# Patient Record
Sex: MALE | Race: WHITE | NOT HISPANIC OR LATINO | URBAN - METROPOLITAN AREA
[De-identification: names, ages, dates, MRNs, and addresses within clinical notes are randomized per-mention and may not be internally consistent; named-entity substitution may affect disease eponyms.]

---

## 2024-08-10 ENCOUNTER — EMERGENCY (EMERGENCY)
Facility: HOSPITAL | Age: 24
LOS: 1 days | Discharge: HOME | End: 2024-08-10
Attending: EMERGENCY MEDICINE | Admitting: EMERGENCY MEDICINE
Payer: COMMERCIAL

## 2024-08-10 VITALS
HEIGHT: 69 IN | HEART RATE: 96 BPM | TEMPERATURE: 98 F | SYSTOLIC BLOOD PRESSURE: 134 MMHG | WEIGHT: 184.97 LBS | RESPIRATION RATE: 18 BRPM | DIASTOLIC BLOOD PRESSURE: 88 MMHG | OXYGEN SATURATION: 99 %

## 2024-08-10 VITALS
DIASTOLIC BLOOD PRESSURE: 86 MMHG | OXYGEN SATURATION: 98 % | SYSTOLIC BLOOD PRESSURE: 151 MMHG | RESPIRATION RATE: 18 BRPM | HEART RATE: 86 BPM

## 2024-08-10 LAB
ALBUMIN SERPL ELPH-MCNC: 3.3 G/DL — LOW (ref 3.4–5)
ALP SERPL-CCNC: 78 U/L — SIGNIFICANT CHANGE UP (ref 40–120)
ALT FLD-CCNC: 40 U/L — SIGNIFICANT CHANGE UP (ref 12–42)
ANION GAP SERPL CALC-SCNC: 11 MMOL/L — SIGNIFICANT CHANGE UP (ref 9–16)
APTT BLD: 28.9 SEC — SIGNIFICANT CHANGE UP (ref 24.5–35.6)
AST SERPL-CCNC: 28 U/L — SIGNIFICANT CHANGE UP (ref 15–37)
BASOPHILS # BLD AUTO: 0.01 K/UL — SIGNIFICANT CHANGE UP (ref 0–0.2)
BASOPHILS NFR BLD AUTO: 0.1 % — SIGNIFICANT CHANGE UP (ref 0–2)
BILIRUB SERPL-MCNC: 0.4 MG/DL — SIGNIFICANT CHANGE UP (ref 0.2–1.2)
BUN SERPL-MCNC: 18 MG/DL — SIGNIFICANT CHANGE UP (ref 7–23)
CALCIUM SERPL-MCNC: 9.2 MG/DL — SIGNIFICANT CHANGE UP (ref 8.5–10.5)
CHLORIDE SERPL-SCNC: 107 MMOL/L — SIGNIFICANT CHANGE UP (ref 96–108)
CO2 SERPL-SCNC: 24 MMOL/L — SIGNIFICANT CHANGE UP (ref 22–31)
CREAT SERPL-MCNC: 1.02 MG/DL — SIGNIFICANT CHANGE UP (ref 0.5–1.3)
EGFR: 104 ML/MIN/1.73M2 — SIGNIFICANT CHANGE UP
EOSINOPHIL # BLD AUTO: 0.04 K/UL — SIGNIFICANT CHANGE UP (ref 0–0.5)
EOSINOPHIL NFR BLD AUTO: 0.4 % — SIGNIFICANT CHANGE UP (ref 0–6)
GLUCOSE SERPL-MCNC: 127 MG/DL — HIGH (ref 70–99)
HCT VFR BLD CALC: 44.7 % — SIGNIFICANT CHANGE UP (ref 39–50)
HGB BLD-MCNC: 14.7 G/DL — SIGNIFICANT CHANGE UP (ref 13–17)
HIV 1 & 2 AB SERPL IA.RAPID: SIGNIFICANT CHANGE UP
IMM GRANULOCYTES NFR BLD AUTO: 0.5 % — SIGNIFICANT CHANGE UP (ref 0–0.9)
INR BLD: 1.17 — SIGNIFICANT CHANGE UP (ref 0.85–1.18)
LYMPHOCYTES # BLD AUTO: 1.64 K/UL — SIGNIFICANT CHANGE UP (ref 1–3.3)
LYMPHOCYTES # BLD AUTO: 15.1 % — SIGNIFICANT CHANGE UP (ref 13–44)
MAGNESIUM SERPL-MCNC: 2 MG/DL — SIGNIFICANT CHANGE UP (ref 1.6–2.6)
MCHC RBC-ENTMCNC: 29.6 PG — SIGNIFICANT CHANGE UP (ref 27–34)
MCHC RBC-ENTMCNC: 32.9 GM/DL — SIGNIFICANT CHANGE UP (ref 32–36)
MCV RBC AUTO: 89.9 FL — SIGNIFICANT CHANGE UP (ref 80–100)
MONOCYTES # BLD AUTO: 0.44 K/UL — SIGNIFICANT CHANGE UP (ref 0–0.9)
MONOCYTES NFR BLD AUTO: 4.1 % — SIGNIFICANT CHANGE UP (ref 2–14)
NEUTROPHILS # BLD AUTO: 8.67 K/UL — HIGH (ref 1.8–7.4)
NEUTROPHILS NFR BLD AUTO: 79.8 % — HIGH (ref 43–77)
NRBC # BLD: 0 /100 WBCS — SIGNIFICANT CHANGE UP (ref 0–0)
PLATELET # BLD AUTO: 278 K/UL — SIGNIFICANT CHANGE UP (ref 150–400)
POTASSIUM SERPL-MCNC: 4.3 MMOL/L — SIGNIFICANT CHANGE UP (ref 3.5–5.3)
POTASSIUM SERPL-SCNC: 4.3 MMOL/L — SIGNIFICANT CHANGE UP (ref 3.5–5.3)
PROT SERPL-MCNC: 7.1 G/DL — SIGNIFICANT CHANGE UP (ref 6.4–8.2)
PROTHROM AB SERPL-ACNC: 12.8 SEC — SIGNIFICANT CHANGE UP (ref 9.5–13)
RBC # BLD: 4.97 M/UL — SIGNIFICANT CHANGE UP (ref 4.2–5.8)
RBC # FLD: 13.7 % — SIGNIFICANT CHANGE UP (ref 10.3–14.5)
SODIUM SERPL-SCNC: 142 MMOL/L — SIGNIFICANT CHANGE UP (ref 132–145)
TROPONIN I, HIGH SENSITIVITY RESULT: <4 NG/L — SIGNIFICANT CHANGE UP
WBC # BLD: 10.85 K/UL — HIGH (ref 3.8–10.5)
WBC # FLD AUTO: 10.85 K/UL — HIGH (ref 3.8–10.5)

## 2024-08-10 PROCEDURE — 73080 X-RAY EXAM OF ELBOW: CPT | Mod: 26,RT

## 2024-08-10 PROCEDURE — 70450 CT HEAD/BRAIN W/O DYE: CPT | Mod: 26,MC

## 2024-08-10 PROCEDURE — 99285 EMERGENCY DEPT VISIT HI MDM: CPT

## 2024-08-10 RX ORDER — BACTERIOSTATIC SODIUM CHLORIDE 0.9 %
1000 VIAL (ML) INJECTION ONCE
Refills: 0 | Status: COMPLETED | OUTPATIENT
Start: 2024-08-10 | End: 2024-08-10

## 2024-08-10 RX ORDER — MUPIROCIN CALCIUM 20 MG/G
1 CREAM TOPICAL ONCE
Refills: 0 | Status: COMPLETED | OUTPATIENT
Start: 2024-08-10 | End: 2024-08-10

## 2024-08-10 RX ORDER — CLOSTRIDIUM TETANI TOXOID ANTIGEN (FORMALDEHYDE INACTIVATED), CORYNEBACTERIUM DIPHTHERIAE TOXOID ANTIGEN (FORMALDEHYDE INACTIVATED), BORDETELLA PERTUSSIS TOXOID ANTIGEN (GLUTARALDEHYDE INACTIVATED), BORDETELLA PERTUSSIS FILAMENTOUS HEMAGGLUTININ ANTIGEN (FORMALDEHYDE INACTIVATED), BORDETELLA PERTUSSIS PERTACTIN ANTIGEN, AND BORDETELLA PERTUSSIS FIMBRIAE 2/3 ANTIGEN 5; 2; 2.5; 5; 3; 5 [LF]/.5ML; [LF]/.5ML; UG/.5ML; UG/.5ML; UG/.5ML; UG/.5ML
0.5 INJECTION, SUSPENSION INTRAMUSCULAR ONCE
Refills: 0 | Status: COMPLETED | OUTPATIENT
Start: 2024-08-10 | End: 2024-08-10

## 2024-08-10 RX ADMIN — Medication 1000 MILLILITER(S): at 13:26

## 2024-08-10 RX ADMIN — CLOSTRIDIUM TETANI TOXOID ANTIGEN (FORMALDEHYDE INACTIVATED), CORYNEBACTERIUM DIPHTHERIAE TOXOID ANTIGEN (FORMALDEHYDE INACTIVATED), BORDETELLA PERTUSSIS TOXOID ANTIGEN (GLUTARALDEHYDE INACTIVATED), BORDETELLA PERTUSSIS FILAMENTOUS HEMAGGLUTININ ANTIGEN (FORMALDEHYDE INACTIVATED), BORDETELLA PERTUSSIS PERTACTIN ANTIGEN, AND BORDETELLA PERTUSSIS FIMBRIAE 2/3 ANTIGEN 0.5 MILLILITER(S): 5; 2; 2.5; 5; 3; 5 INJECTION, SUSPENSION INTRAMUSCULAR at 13:24

## 2024-08-10 RX ADMIN — MUPIROCIN CALCIUM 1 APPLICATION(S): 20 CREAM TOPICAL at 13:25

## 2024-08-10 NOTE — ED PROVIDER NOTE - PATIENT PORTAL LINK FT
You can access the FollowMyHealth Patient Portal offered by [facility] by registering at the following website: [email or web address]. By joining [employer]’s FollowMyHealth portal, you will also be able to view your health information using other applications (apps) compatible with our system.
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
## 2024-08-10 NOTE — ED PROVIDER NOTE - PROGRESS NOTE DETAILS
Patient reports feeling better after hydration.  All results discussed with patient.  Cardiac and neurological workup negative.  Discussed possibility of a small concussion and need to rest for the next several days.  Patient demonstrates full understanding.  Patient does not have a primary care doctor here in the city so we will set him up with our care coordinator so she can get him both primary care and cardiology follow-up.  Patient demonstrates full understanding of emergent return precautions and follow-up plan.
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
## 2024-08-10 NOTE — ED PROVIDER NOTE - NSFOLLOWUPINSTRUCTIONS_ED_ALL_ED_FT
-YOU NEED FOLLOW-UP WITH BOTH A PRIMARY CARE DOCTOR. AND A CARDIOLOGIST.  OUR CARE COORDINATOR SHOULD BE CALLING YOU THIS MONDAY, SO PLEASE ANSWER YOUR PHONE.  BRING ALL PAPERWORK FROM TODAY'S VISIT TO YOUR FOLLOW-UP VISIT.    -TAKE OVER THE COUNTER TYLENOL 650MG BY MOUTH EVERY 4-6 HOURS AS NEEDED FOR PAIN.  DO NOT MIX WITH ALCOHOL OR OTHER PRESCRIPTION MEDICATIONS THAT ALREADY CONTAIN TYLENOL OR ACETAMINOPHEN.   -PLEASE RETURN TO THE ER IMMEDIATELY OR CALL 911 FOR ANY HIGH FEVER, TROUBLE BREATHING, VOMITING, SEVERE PAIN, OR ANY OTHER CONCERNS.    Concussion, Adult  Three rear views of the head showing how quick, sudden head movements injure the brain.  A concussion is a brain injury from a hard, direct hit (trauma) to the head or body. This direct hit causes the brain to shake quickly back and forth inside the skull. This can damage brain cells and cause chemical changes in the brain. A concussion may also be known as a mild traumatic brain injury (TBI).    The effects of a concussion can be serious. If you have a concussion, you should be very careful to avoid having a second concussion.    What are the causes?  This condition is caused by:  A direct hit to your head.  Sudden movement of your body that causes your brain to move back and forth inside the skull, such as in a car crash.  What are the signs or symptoms?  The signs of a concussion can be hard to notice. Early on, they may be missed by you, family members, and health care providers. You may look fine on the outside but may act or feel differently.    Every head injury is different. Symptoms are usually temporary but may last for days, weeks, or even months. Some symptoms appear right away, but other symptoms may not show up for hours or days.    Physical symptoms    Headaches.  Dizziness and problems with coordination or balance.  Sensitivity to light or noise.  Nausea or vomiting.  Tiredness (fatigue).  Vision or hearing problems.  Seizure.  Mental and emotional symptoms    Irritability or mood changes.  Memory problems.  Trouble concentrating, organizing, or making decisions.  Changes in eating or sleeping patterns.  Slowness in thinking, acting or reacting, speaking, or reading.  Anxiety or depression.  How is this diagnosed?  This condition is diagnosed based on your symptoms and injury.    You may also have tests, including:  Imaging tests, such as a CT scan or an MRI.  Neuropsychological tests. These measure your thinking, understanding, learning, and memory.  How is this treated?  Treatment for this condition includes:  Stopping sports or activity if you are injured.  Physical and mental rest and careful observation, usually at home.  Medicines to help with symptoms such as headaches, nausea, or difficulty sleeping.  Referral to a concussion clinic or rehab center.  Follow these instructions at home:  Activity    Limit activities that require a lot of thought or concentration, such as:  Doing homework or job-related work.  Watching TV.  Using the computer or phone.  Playing memory games and doing puzzles.  Rest helps your brain heal. Make sure you:  Get plenty of sleep. Most adults should get 7–9 hours of sleep each night.  Rest during the day. Take naps or rest breaks when you feel tired.  Avoid high-intensity exercise or physical activities that take a lot of effort. Stop any activity that worsens symptoms. Your health care provider may recommend light exercise such as walking.  Do not do high-risk activities that could cause a second concussion, such as riding a bike or playing sports.  Ask your health care provider when you can return to your normal activities, such as school, work, sports, and driving. Your ability to react may be slower after a brain injury. Never do these activities if you are dizzy.  General instructions    A bottle of beer, a glass of wine, and a glass of hard liquor with a "do not drink" sign over them.   Take over-the-counter and prescription medicines only as told by your health care provider. Some medicines, such as blood thinners (anticoagulants) and aspirin, may increase the risk for complications, such as bleeding.  Avoid taking opioid pain medicine while recovering from a concussion.  Do not drink alcohol until your health care provider says you can. Drinking alcohol may slow your recovery and can put you at risk of further injury.  Watch your symptoms and tell others around you to do the same. Complications sometimes occur after a concussion.  Tell your , teachers, school nurse, school counselor, , or  about your injury, symptoms, and restrictions.  See a mental health therapist if you feel anxious or depressed. Managing this condition can be challenging.  Keep all follow-up visits. Your health care provider will check on your recovery and give you a plan for returning to activities.  How is this prevented?  Avoiding another brain injury is very important. In rare cases, another injury can lead to permanent brain damage, brain swelling, or death. The risk of this is greatest during the first 7–10 days after a head injury. Avoid injuries by:  Stopping activities that could lead to a second concussion, such as contact or recreational sports, until your health care provider says it is okay.  Taking these actions once you have returned to sports or activities:  Avoid plays or moves that can cause you to crash into another person. This is how most concussions occur.  Follow the rules and be respectful of other players. Do not engage in violent or illegal plays.  Getting regular exercise that includes strength and balance training.  Wearing a properly fitting helmet during sports, biking, or other activities. Helmets can help protect you from serious skull and brain injuries, but they may not protect you from a concussion. Even when wearing a helmet, you should avoid being hit in the head.  Where to find more information  Centers for Disease Control and Prevention: cdc.gov  Contact a health care provider if:  Your symptoms do not improve or get worse.  You have new symptoms.  You have another injury.  Your coordination gets worse.  You have unusual behavior changes.  Get help right away if:  You have a severe or worsening headache.  You have weakness or numbness in any part of your body, slurred speech, vision changes, or confusion.  You vomit repeatedly.  You lose consciousness, are sleepier than normal, or are difficult to wake up.  You have a seizure.  These symptoms may be an emergency. Get help right away. Call 911.  Do not wait to see if the symptoms will go away.  Do not drive yourself to the hospital.  Also, get help right away if:  You have thoughts of hurting yourself or others.  Take one of these steps if you feel like you may hurt yourself or others, or have thoughts about taking your own life:  Go to your nearest emergency room.  Call 911.  Call the National Suicide Prevention Lifeline at 1-684.450.2384 or 772. This is open 24 hours a day.  Text the Crisis Text Line at 299478.  This information is not intended to replace advice given to you by your health care provider. Make sure you discuss any questions you have with your health care provider.
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
## 2024-08-10 NOTE — ED PROVIDER NOTE - PHYSICAL EXAMINATION
VITAL SIGNS: I have reviewed nursing notes and confirm.  CONSTITUTIONAL: Well-developed; well-nourished; in no apparent distress.  SKIN: Positive abrasions over the posterior scalp and the posterior aspect of the right elbow.  No lacerations.  HEAD: Normocephalic; atraumatic.  EYES: PERRL, EOM intact; conjunctiva and sclera clear.  ENT: No nasal discharge; airway clear.  NECK: Supple; non tender Midline.  CARD: S1, S2 normal; no murmurs, gallops, or rubs. Regular rate and rhythm.  RESP: No wheezes, rales or rhonchi.  ABD: Normal bowel sounds; soft; non-distended; non-tender; no hepatosplenomegaly.  MSK: Normal ROM. No clubbing, cyanosis or edema.  There is mild tenderness palpation over the right olecranon.  Otherwise other aspects of all 4 extremities are within normal limits.  NEURO: Alert, oriented. Grossly unremarkable.  PSYCH: Cooperative, appropriate.

## 2024-08-10 NOTE — ED PROVIDER NOTE - SECONDARY DIAGNOSIS.
H/O oophorectomy    S/P cardiac pacemaker procedure  [employer] ADDRL1  2011  S/P cholecystectomy    S/P partial resection of colon  > 5 yrs ago Head injury

## 2024-08-12 PROBLEM — Z00.00 ENCOUNTER FOR PREVENTIVE HEALTH EXAMINATION: Status: ACTIVE | Noted: 2024-08-12

## 2024-08-13 ENCOUNTER — APPOINTMENT (OUTPATIENT)
Dept: INTERNAL MEDICINE | Facility: CLINIC | Age: 24
End: 2024-08-13
Payer: COMMERCIAL

## 2024-08-13 VITALS
BODY MASS INDEX: 42.44 KG/M2 | OXYGEN SATURATION: 99 % | WEIGHT: 280 LBS | HEIGHT: 68 IN | DIASTOLIC BLOOD PRESSURE: 85 MMHG | TEMPERATURE: 97.3 F | SYSTOLIC BLOOD PRESSURE: 133 MMHG | HEART RATE: 73 BPM

## 2024-08-13 DIAGNOSIS — F32.A ANXIETY DISORDER, UNSPECIFIED: ICD-10-CM

## 2024-08-13 DIAGNOSIS — Z86.59 PERSONAL HISTORY OF OTHER MENTAL AND BEHAVIORAL DISORDERS: ICD-10-CM

## 2024-08-13 DIAGNOSIS — F41.9 ANXIETY DISORDER, UNSPECIFIED: ICD-10-CM

## 2024-08-13 DIAGNOSIS — G44.309 POST-TRAUMATIC HEADACHE, UNSPECIFIED, NOT INTRACTABLE: ICD-10-CM
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
## 2024-08-13 PROCEDURE — 99204 OFFICE O/P NEW MOD 45 MIN: CPT | Mod: 25

## 2024-08-13 PROCEDURE — G2211 COMPLEX E/M VISIT ADD ON: CPT | Mod: NC

## 2024-08-13 RX ORDER — ESCITALOPRAM OXALATE 10 MG/1
10 TABLET, FILM COATED ORAL
Refills: 0 | Status: ACTIVE | COMMUNITY
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
## 2024-08-14 DIAGNOSIS — S00.01XA ABRASION OF SCALP, INITIAL ENCOUNTER: ICD-10-CM

## 2024-08-14 DIAGNOSIS — S30.810A ABRASION OF LOWER BACK AND PELVIS, INITIAL ENCOUNTER: ICD-10-CM

## 2024-08-14 DIAGNOSIS — R55 SYNCOPE AND COLLAPSE: ICD-10-CM

## 2024-08-14 DIAGNOSIS — S50.311A ABRASION OF RIGHT ELBOW, INITIAL ENCOUNTER: ICD-10-CM

## 2024-08-14 DIAGNOSIS — F17.290 NICOTINE DEPENDENCE, OTHER TOBACCO PRODUCT, UNCOMPLICATED: ICD-10-CM

## 2024-08-14 DIAGNOSIS — F41.9 ANXIETY DISORDER, UNSPECIFIED: ICD-10-CM

## 2024-08-14 DIAGNOSIS — F32.A DEPRESSION, UNSPECIFIED: ICD-10-CM

## 2024-08-14 DIAGNOSIS — Z23 ENCOUNTER FOR IMMUNIZATION: ICD-10-CM

## 2024-08-14 DIAGNOSIS — S06.9XAA UNSPECIFIED INTRACRANIAL INJURY WITH LOSS OF CONSCIOUSNESS STATUS UNKNOWN, INITIAL ENCOUNTER: ICD-10-CM
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
## 2024-08-14 PROBLEM — G44.309 POST-TRAUMATIC HEADACHE, NOT INTRACTABLE, UNSPECIFIED CHRONICITY PATTERN: Status: ACTIVE | Noted: 2024-08-14
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
## 2024-08-14 NOTE — END OF VISIT
[] : Resident [FreeTextEntry3] : I, Dr. Luke Nunez, saw and evaluated this patient in the presence of the resident. I discussed the management with the resident.

## 2024-08-14 NOTE — HEALTH RISK ASSESSMENT
[2 - 3 times a week (3 pts)] : 2 - 3  times a week (3 points) [Yes] : In the past 12 months have you used drugs other than those required for medical reasons? Yes [2] : 2) Feeling down, depressed, or hopeless for more than half of the days (2) [de-identified] : Occasional [de-identified] : Marijuana [de-identified] : Biking, Walking, usually sedentary  [de-identified] : Meat, vegetable, grain [Alone] : lives alone [Smoke Detector] : smoke detector [Seat Belt] :  uses seat belt [Sunscreen] : uses sunscreen [FreeTextEntry2] :   [Current] : Current [de-identified] : Daily, 8 years [0] : 2) Feeling down, depressed, or hopeless: Not at all (0) [PHQ-2 Negative - No further assessment needed] : PHQ-2 Negative - No further assessment needed [[id number]] : 0

## 2024-08-14 NOTE — REVIEW OF SYSTEMS
[Headache] : headache [Dizziness] : dizziness [Unsteady Walk] : no ataxia [Memory Loss] : memory loss [Negative] : Gastrointestinal
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
## 2024-08-14 NOTE — HEALTH RISK ASSESSMENT
[2 - 3 times a week (3 pts)] : 2 - 3  times a week (3 points) [Yes] : In the past 12 months have you used drugs other than those required for medical reasons? Yes [2] : 2) Feeling down, depressed, or hopeless for more than half of the days (2) [de-identified] : Occasional [de-identified] : Marijuana [de-identified] : Biking, Walking, usually sedentary  [de-identified] : Meat, vegetable, grain [Alone] : lives alone [Smoke Detector] : smoke detector [Seat Belt] :  uses seat belt [Sunscreen] : uses sunscreen [FreeTextEntry2] :   [Current] : Current [de-identified] : Daily, 8 years [0] : 2) Feeling down, depressed, or hopeless: Not at all (0) [PHQ-2 Negative - No further assessment needed] : PHQ-2 Negative - No further assessment needed [[id number]] : 0
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
## 2024-08-14 NOTE — HEALTH RISK ASSESSMENT
[2 - 3 times a week (3 pts)] : 2 - 3  times a week (3 points) [Yes] : In the past 12 months have you used drugs other than those required for medical reasons? Yes [2] : 2) Feeling down, depressed, or hopeless for more than half of the days (2) [de-identified] : Occasional [de-identified] : Marijuana [de-identified] : Biking, Walking, usually sedentary  [de-identified] : Meat, vegetable, grain [Alone] : lives alone [Smoke Detector] : smoke detector [Seat Belt] :  uses seat belt [Sunscreen] : uses sunscreen [FreeTextEntry2] :   [Current] : Current [de-identified] : Daily, 8 years [0] : 2) Feeling down, depressed, or hopeless: Not at all (0) [PHQ-2 Negative - No further assessment needed] : PHQ-2 Negative - No further assessment needed [[id number]] : 0
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
## 2024-09-09 ENCOUNTER — APPOINTMENT (OUTPATIENT)
Dept: INTERNAL MEDICINE | Facility: CLINIC | Age: 24
End: 2024-09-09

## 2024-10-10 ENCOUNTER — INPATIENT (INPATIENT)
Facility: HOSPITAL | Age: 24
LOS: 0 days | Discharge: HOME | End: 2024-10-11
Attending: STUDENT IN AN ORGANIZED HEALTH CARE EDUCATION/TRAINING PROGRAM | Admitting: STUDENT IN AN ORGANIZED HEALTH CARE EDUCATION/TRAINING PROGRAM
Payer: COMMERCIAL

## 2024-10-10 VITALS
HEART RATE: 82 BPM | TEMPERATURE: 98 F | RESPIRATION RATE: 18 BRPM | HEIGHT: 68 IN | SYSTOLIC BLOOD PRESSURE: 138 MMHG | DIASTOLIC BLOOD PRESSURE: 91 MMHG | OXYGEN SATURATION: 96 % | WEIGHT: 270.07 LBS

## 2024-10-10 DIAGNOSIS — Z29.9 ENCOUNTER FOR PROPHYLACTIC MEASURES, UNSPECIFIED: ICD-10-CM

## 2024-10-10 DIAGNOSIS — R59.0 LOCALIZED ENLARGED LYMPH NODES: ICD-10-CM

## 2024-10-10 LAB
ALBUMIN SERPL ELPH-MCNC: 3.5 G/DL — SIGNIFICANT CHANGE UP (ref 3.4–5)
ALP SERPL-CCNC: 114 U/L — SIGNIFICANT CHANGE UP (ref 40–120)
ALT FLD-CCNC: 57 U/L — HIGH (ref 12–42)
ANION GAP SERPL CALC-SCNC: 9 MMOL/L — SIGNIFICANT CHANGE UP (ref 9–16)
APPEARANCE UR: ABNORMAL
AST SERPL-CCNC: 25 U/L — SIGNIFICANT CHANGE UP (ref 15–37)
BACTERIA # UR AUTO: NEGATIVE /HPF — SIGNIFICANT CHANGE UP
BASOPHILS # BLD AUTO: 0 K/UL — SIGNIFICANT CHANGE UP (ref 0–0.2)
BILIRUB SERPL-MCNC: 0.4 MG/DL — SIGNIFICANT CHANGE UP (ref 0.2–1.2)
BILIRUB UR-MCNC: NEGATIVE — SIGNIFICANT CHANGE UP
BUN SERPL-MCNC: 9 MG/DL — SIGNIFICANT CHANGE UP (ref 7–23)
CALCIUM SERPL-MCNC: 9.2 MG/DL — SIGNIFICANT CHANGE UP (ref 8.5–10.5)
CHLORIDE SERPL-SCNC: 103 MMOL/L — SIGNIFICANT CHANGE UP (ref 96–108)
CO2 SERPL-SCNC: 29 MMOL/L — SIGNIFICANT CHANGE UP (ref 22–31)
COLOR SPEC: YELLOW — SIGNIFICANT CHANGE UP
CREAT SERPL-MCNC: 0.79 MG/DL — SIGNIFICANT CHANGE UP (ref 0.5–1.3)
DIFF PNL FLD: NEGATIVE — SIGNIFICANT CHANGE UP
EGFR: 126 ML/MIN/1.73M2 — SIGNIFICANT CHANGE UP
EOSINOPHIL # BLD AUTO: 0.02 K/UL — SIGNIFICANT CHANGE UP (ref 0–0.5)
EOSINOPHIL NFR BLD AUTO: 0.3 % — SIGNIFICANT CHANGE UP (ref 0–6)
EPI CELLS # UR: PRESENT
GLUCOSE SERPL-MCNC: 96 MG/DL — SIGNIFICANT CHANGE UP (ref 70–99)
GLUCOSE UR QL: NEGATIVE MG/DL — SIGNIFICANT CHANGE UP
HCT VFR BLD CALC: 46.4 % — SIGNIFICANT CHANGE UP (ref 39–50)
HGB BLD-MCNC: 14.7 G/DL — SIGNIFICANT CHANGE UP (ref 13–17)
HIV 1 & 2 AB SERPL IA.RAPID: SIGNIFICANT CHANGE UP
IMM GRANULOCYTES NFR BLD AUTO: 0.3 % — SIGNIFICANT CHANGE UP (ref 0–0.9)
KETONES UR-MCNC: 15 MG/DL
LACTATE BLDV-MCNC: 1.4 MMOL/L — SIGNIFICANT CHANGE UP (ref 0.5–2)
LEUKOCYTE ESTERASE UR-ACNC: NEGATIVE — SIGNIFICANT CHANGE UP
LIDOCAIN IGE QN: >250 U/L — HIGH (ref 16–77)
LYMPHOCYTES # BLD AUTO: 1.4 K/UL — SIGNIFICANT CHANGE UP (ref 1–3.3)
LYMPHOCYTES # BLD AUTO: 17.9 % — SIGNIFICANT CHANGE UP (ref 13–44)
MCHC RBC-ENTMCNC: 28.2 PG — SIGNIFICANT CHANGE UP (ref 27–34)
MCHC RBC-ENTMCNC: 31.7 GM/DL — LOW (ref 32–36)
MCV RBC AUTO: 88.9 FL — SIGNIFICANT CHANGE UP (ref 80–100)
MONOCYTES # BLD AUTO: 0.51 K/UL — SIGNIFICANT CHANGE UP (ref 0–0.9)
MONOCYTES NFR BLD AUTO: 6.5 % — SIGNIFICANT CHANGE UP (ref 2–14)
NEUTROPHILS # BLD AUTO: 5.87 K/UL — SIGNIFICANT CHANGE UP (ref 1.8–7.4)
NEUTROPHILS NFR BLD AUTO: 75 % — SIGNIFICANT CHANGE UP (ref 43–77)
NITRITE UR-MCNC: NEGATIVE — SIGNIFICANT CHANGE UP
NRBC # BLD: 0 /100 WBCS — SIGNIFICANT CHANGE UP (ref 0–0)
PH UR: 6 — SIGNIFICANT CHANGE UP (ref 5–8)
PLATELET # BLD AUTO: 284 K/UL — SIGNIFICANT CHANGE UP (ref 150–400)
POTASSIUM SERPL-MCNC: 4.3 MMOL/L — SIGNIFICANT CHANGE UP (ref 3.5–5.3)
POTASSIUM SERPL-SCNC: 4.3 MMOL/L — SIGNIFICANT CHANGE UP (ref 3.5–5.3)
PROT SERPL-MCNC: 7.7 G/DL — SIGNIFICANT CHANGE UP (ref 6.4–8.2)
PROT UR-MCNC: NEGATIVE MG/DL — SIGNIFICANT CHANGE UP
RBC # BLD: 5.22 M/UL — SIGNIFICANT CHANGE UP (ref 4.2–5.8)
RBC # FLD: 12.8 % — SIGNIFICANT CHANGE UP (ref 10.3–14.5)
RBC CASTS # UR COMP ASSIST: 1 /HPF — SIGNIFICANT CHANGE UP (ref 0–4)
SODIUM SERPL-SCNC: 141 MMOL/L — SIGNIFICANT CHANGE UP (ref 132–145)
SP GR SPEC: 1.02 — SIGNIFICANT CHANGE UP (ref 1–1.03)
TROPONIN I, HIGH SENSITIVITY RESULT: <4 NG/L — SIGNIFICANT CHANGE UP
UROBILINOGEN FLD QL: 1 MG/DL — SIGNIFICANT CHANGE UP (ref 0.2–1)
WBC # BLD: 7.82 K/UL — SIGNIFICANT CHANGE UP (ref 3.8–10.5)
WBC # FLD AUTO: 7.82 K/UL — SIGNIFICANT CHANGE UP (ref 3.8–10.5)
WBC UR QL: 1 /HPF — SIGNIFICANT CHANGE UP (ref 0–5)

## 2024-10-10 PROCEDURE — 99285 EMERGENCY DEPT VISIT HI MDM: CPT

## 2024-10-10 PROCEDURE — 99223 1ST HOSP IP/OBS HIGH 75: CPT | Mod: GC

## 2024-10-10 PROCEDURE — 74177 CT ABD & PELVIS W/CONTRAST: CPT | Mod: 26,MC

## 2024-10-10 RX ORDER — SODIUM CHLORIDE 0.9 % (FLUSH) 0.9 %
1000 SYRINGE (ML) INJECTION ONCE
Refills: 0 | Status: COMPLETED | OUTPATIENT
Start: 2024-10-10 | End: 2024-10-10

## 2024-10-10 RX ORDER — ESCITALOPRAM OXALATE 10 MG
20 TABLET ORAL DAILY
Refills: 0 | Status: DISCONTINUED | OUTPATIENT
Start: 2024-10-10 | End: 2024-10-11

## 2024-10-10 RX ORDER — ACETAMINOPHEN 325 MG
650 TABLET ORAL EVERY 6 HOURS
Refills: 0 | Status: DISCONTINUED | OUTPATIENT
Start: 2024-10-10 | End: 2024-10-11

## 2024-10-10 RX ORDER — ESCITALOPRAM OXALATE 10 MG
1 TABLET ORAL
Refills: 0 | DISCHARGE

## 2024-10-10 RX ORDER — SODIUM CHLORIDE IRRIG SOLUTION 0.9 %
1000 SOLUTION, IRRIGATION IRRIGATION
Refills: 0 | Status: DISCONTINUED | OUTPATIENT
Start: 2024-10-10 | End: 2024-10-11

## 2024-10-10 RX ORDER — ONDANSETRON HCL/PF 4 MG/2 ML
4 VIAL (ML) INJECTION EVERY 8 HOURS
Refills: 0 | Status: DISCONTINUED | OUTPATIENT
Start: 2024-10-10 | End: 2024-10-11

## 2024-10-10 RX ORDER — MAG HYDROX/ALUMINUM HYD/SIMETH 200-200-20
30 SUSPENSION, ORAL (FINAL DOSE FORM) ORAL EVERY 4 HOURS
Refills: 0 | Status: DISCONTINUED | OUTPATIENT
Start: 2024-10-10 | End: 2024-10-11

## 2024-10-10 RX ORDER — ACETAMINOPHEN 325 MG
1000 TABLET ORAL ONCE
Refills: 0 | Status: COMPLETED | OUTPATIENT
Start: 2024-10-10 | End: 2024-10-10

## 2024-10-10 RX ADMIN — Medication 1000 MILLILITER(S): at 14:57

## 2024-10-10 RX ADMIN — Medication 1000 MILLILITER(S): at 18:24

## 2024-10-10 RX ADMIN — Medication 400 MILLIGRAM(S): at 14:57

## 2024-10-10 NOTE — ED PROVIDER NOTE - CLINICAL SUMMARY MEDICAL DECISION MAKING FREE TEXT BOX
24-year-old male, presents to the emergency department complaining of epigastric abdominal pain for the past few days. Patient found to have epigastric tenderness to palpation on exam.  Will plan to repeat medical labs here including a lipase, CBC, and CMP.  Will also obtain CT abdomen and pelvis with IV contrast as well as give IV fluids.  Patient instructed to remain n.p.o. whilst waiting for his imaging to be completed.  IV Tylenol ordered for pain relief.  Will reassess and dispo pending medical workup.

## 2024-10-10 NOTE — ED PROVIDER NOTE - NS ED ROS FT
+abd pain  Denies fevers, chills, diarrhea, constipation, urinary symptoms, chest pain, palpitations, shortness of breath, dyspnea on exertion, syncope/near syncope, cough/URI symptoms, headache, weakness, numbness, focal deficits, visual changes, gait or balance changes, dizziness

## 2024-10-10 NOTE — ED PROVIDER NOTE - OBJECTIVE STATEMENT
24-year-old male, presents to the emergency department complaining of epigastric abdominal pain for the past few days.  Patient went to a local urgent care yesterday who performed labs on him.  Today the patient was called because his labs returned, showing an elevated lipase level of 736.  Patient denies any nausea or vomiting tday but states he had an episode of vomiting yesterday.  He also endorses having  2 beers 5 nights ago.

## 2024-10-10 NOTE — H&P ADULT - ATTENDING COMMENTS
24-year-old male, with PMHx of GERD and depression/anxiety presenting with 2 days of epigastric found to have elevated lipase and CT finding of acute pancreatitis, admitted for further management    Plan   - likely drug induced 2/2 buproprion. CTAP w/o gallstones/CBD dilation. Lipid panel pending   - c/w maintenance fluids. advance diet as tolerated   - pain control- mild pain, c/w tylenol prn   - f/up GC/Chlymdia screen, RPR   - outpt PCP/GI f/up

## 2024-10-10 NOTE — H&P ADULT - PROBLEM SELECTOR PLAN 1
Pt with 2 days of dull epigastric pain and one episode of emesis yesterday found to have Lipase of 736 at urgent care. Pt  denies history of pancreatitis or gallstone in the past. Pt drinks alcohol twice weekly (2-4 drinks), no tobacco, or recreational drugs,. Pt reports regular BM in form and color. In the ED pt received 2L IVF and Ofirmev. CTAP showing mild acute interstitial pancreatitis involving the pancreatic tail without evidence of necrosis or peripancreatic fluid collection. Lipase >250 in ED. On exam pt in NAD and reports minimal pain and denies CP, SOB, N/V/D. DDx include alcohol vs gallstone vs TG vs drug induced. Pt with mild alcohol use, no gallstone on CT, Unknown TG level and recently started on  Wellbutrin by his Psychiatrist over the past 2 weeks, however was not tolerating it well (Headaches) and discontinued after experiencing epigastric pain as above. Etiology most likely Drug induced vs TG. Pt VSS will start maintenance fluids  as diet as tolerated.     - c/w LR 150ml/h   - Low fat diet as tolerated   - f/u Lipid panel in AM   - Tylenol for pain PRN Pt with 2 days of dull epigastric pain and one episode of emesis yesterday found to have Lipase of 736 at urgent care. Pt  denies history of pancreatitis or gallstone in the past. Pt drinks alcohol twice weekly (2-4 drinks), no tobacco, or recreational drugs,. Pt reports regular BM in form and color. In the ED pt received 2L IVF and Ofirmev. CTAP showing mild acute interstitial pancreatitis involving the pancreatic tail without evidence of necrosis or peripancreatic fluid collection. Lipase >250 in ED. On exam pt in NAD and reports minimal pain and denies CP, SOB, N/V/D. DDx include alcohol vs gallstone vs TG vs drug induced. Pt with mild alcohol use, no gallstone on CT, Unknown TG level and recently started on  Wellbutrin by his Psychiatrist over the past 2 weeks, however was not tolerating it well (Headaches) and discontinued after experiencing epigastric pain as above. Etiology most likely Drug induced vs TG. Pt VSS will start maintenance fluids  as diet as tolerated.     - c/w LR 150ml/h   - Low fat diet as tolerated   - f/u Lipid panel   - hold buproprion, only recently started   - Tylenol for pain PRN

## 2024-10-10 NOTE — ED PROVIDER NOTE - ATTENDING APP SHARED VISIT CONTRIBUTION OF CARE
24-year-old male with abdominal pain, diagnosed in the ED with pancreatitis, admitted to medicine service for pain control, IV fluid hydration, n.p.o. status for treatment of pancreatitis.

## 2024-10-10 NOTE — H&P ADULT - NSHPSOCIALHISTORY_GEN_ALL_CORE
Pt lives in [place], [place], works as a  Pt lives in [place], [place], works as a   Sexually active with men (receptive)

## 2024-10-10 NOTE — ED ADULT TRIAGE NOTE - CHIEF COMPLAINT QUOTE
Pt sent from [facility] for pancreatitis. Pt states he was seen there for abdominal pain yesterday and that bloodwork resulted today. Pt denies any N/V/D today but states he vomited yesterday.

## 2024-10-10 NOTE — H&P ADULT - ASSESSMENT
Pt is a 24-year-old male, with PMHx of GERD and depression/anxiety presenting with 2 days of epigastric found to have elevated lipase and CT finding of acute pancreatitis, admitted for further management.

## 2024-10-10 NOTE — H&P ADULT - PROBLEM SELECTOR PLAN 2
Pt with incidental finding of perirectal lymphadenopathy largest node measuring 1 cm in short axis. DDx include infectious/inflammatory or neoplastic. As perirectal LAD dose not seem to be a complication of pancreatitis would recommend further evaluation as oupt.    - f/u outpt Pt with incidental finding of perirectal lymphadenopathy largest node measuring 1 cm in short axis. DDx include infectious/inflammatory or neoplastic. As perirectal LAD does not seem to be a complication of pancreatitis. Pt reports being sexually active with men including anal receptive. Pt denies previous STI himself or in past partners. Currenly pt is not experiencing any symptoms of STIs.     - consider STI testing if symptomatic C/G, HSV, Syphilis   - f/u outpt

## 2024-10-10 NOTE — ED ADULT NURSE NOTE - OBJECTIVE STATEMENT
c/o midgastric abd pain 5/10, states went to urgent care and was told to come to ED for pancreatitis. GCS 15, pt denies medical problems.

## 2024-10-10 NOTE — H&P ADULT - NSHPLABSRESULTS_GEN_ALL_CORE
Urinalysis with Rflx Culture (10.10.24 @ 13:37)   Urine Appearance: Cloudy  Color: Yellow  Specific Gravity: 1.025  pH Urine: 6.0  Protein, Urine: Negative mg/dL  Glucose Qualitative, Urine: Negative mg/dL  Ketone - Urine: 15 mg/dL  Blood, Urine: Negative  Bilirubin: Negative  Urobilinogen: 1.0 mg/dL  Leukocyte Esterase Concentration: Negative  Nitrite: NegativeBlood Gas Venous - Lactate (10.10.24 @ 13:37)   Blood Gas Venous - Lactate: 1.4 mmol/LLipase (10.10.24 @ 13:37)   Lipase: >250 U/LComplete Blood Count + Automated Diff (10.10.24 @ 13:37)   WBC Count: 7.82 K/uL  RBC Count: 5.22 M/uL  Hemoglobin: 14.7 g/dL  Hematocrit: 46.4 %  Mean Cell Volume: 88.9 fl  Mean Cell Hemoglobin: 28.2 pg  Mean Cell Hemoglobin Conc: 31.7 gm/dL  Red Cell Distrib Width: 12.8 %  Platelet Count - Automated: 284 K/uL  Auto Neutrophil #: 5.87 K/uL  Auto Lymphocyte #: 1.40 K/uL  Auto Monocyte #: 0.51 K/uL  Auto Eosinophil #: 0.02 K/uL  Auto Basophil #: 0.00 K/uL  Auto Neutrophil %: 75.0: Differential percentages must be correlated with absolute numbers for   clinical significance. %  Auto Lymphocyte %: 17.9 %  Auto Monocyte %: 6.5 %  Auto Eosinophil %: 0.3 %  Auto Immature Granulocyte %: 0.3: (Includes meta, myelo and promyelocytes). Mild elevations in immature   granulocytes may be seen with many inflammatory processes and pregnancy;   clinical correlation suggested. %  Nucleated RBC: 0 /100 WBCs    Comprehensive Metabolic Panel (10.10.24 @ 13:37)   Sodium: 141 mmoL/L  Potassium: 4.3 mmol/L  Chloride: 103 mmol/L  Carbon Dioxide: 29 mmol/L  Anion Gap: 9 mmoL/L  Blood Urea Nitrogen: 9 mg/dL  Creatinine: 0.79 mg/dL  Glucose: 96 mg/dL  Calcium: 9.2 mg/dL  Protein Total: 7.7 g/dL  Albumin: 3.5 g/dL  Bilirubin Total: 0.4: Use of this assay is not recommended for patients undergoing treatment   with eltrombopag due to the potential for falsely elevated results mg/dL  Alkaline Phosphatase: 114 U/L  Aspartate Aminotransferase (AST/SGOT): 25 U/L  Alanine Aminotransferase (ALT/SGPT): 57 U/L  eGFR: 126: The estimated glomerular filtration rate (eGFR) calculation is based on   the 2021 CKD-EPI creatinine equation, which is validated in male and   female population 18 years of age and older (N Engl J Med 2021;   [id number]). mL/min/1.73m2    < from: CT Abdomen and Pelvis w/ IV Cont (10.10.24 @ 15:50) >    IMPRESSION:    1. Mild acute interstitial pancreatitis involving the pancreatic tail   without evidence of necrosis or peripancreatic fluid collection.  2. Perirectal lymphadenopathy of unclear etiology. Cannot exclude   infectious/inflammatory or neoplastic etiology. If clinically indicated,   direct visualization may be of value.        --- End of Report ---      < end of copied text >

## 2024-10-10 NOTE — ED PROVIDER NOTE - PHYSICAL EXAMINATION
VITAL SIGNS: I have reviewed nursing notes and confirm.  CONSTITUTIONAL: Well-developed; well-nourished; in no acute distress.  SKIN: Skin is warm and dry, no acute rash.  HEAD: Normocephalic; atraumatic.  NECK: Supple; non tender.  CARD: S1, S2 normal; no murmurs, gallops, or rubs. Regular rate and rhythm.  RESP: No wheezes, rales or rhonchi.  ABD: Soft; +epigastric ttp; no rebound or guarding.  EXT: Normal ROM. No clubbing, cyanosis or edema.  NEURO: Alert, oriented. Grossly unremarkable. LANDRY, normal tone, no gross motor or sensory changes. Fluent speech.   PSYCH: Cooperative, appropriate. Mood and affect wnl.

## 2024-10-10 NOTE — H&P ADULT - PROBLEM SELECTOR PLAN 3
Pt with anxiety and depression on Lexapro 20mg qd, Pt with anxiety and depression on Lexapro 20mg qd. follows with psychiatrist.     - c/w Lexapro 20mg qd  - f/u outpt psychiatrist

## 2024-10-10 NOTE — ED ADULT NURSE NOTE - CHIEF COMPLAINT QUOTE
Pt sent from [facility] for pancreatitis. Pt states he was seen there for abdominal pain yesterday and that bloodwork resulted today. Pt denies any N/V/D today but states he vomited yesterday.

## 2024-10-10 NOTE — ED ADULT NURSE NOTE - NSFALLUNIVINTERV_ED_ALL_ED
Bed/Stretcher in lowest position, wheels locked, appropriate side rails in place/Call bell, personal items and telephone in reach/Instruct patient to call for assistance before getting out of bed/chair/stretcher/Non-slip footwear applied when patient is off stretcher/[place] to call system/Physically safe environment - no spills, clutter or unnecessary equipment/Purposeful proactive rounding/Room/bathroom lighting operational, light cord in reach

## 2024-10-10 NOTE — H&P ADULT - HISTORY OF PRESENT ILLNESS
Pt is a 24-year-old male, with PMHx of GERD and depression/anxiety who presents to the ED complaining of epigastric abdominal pain for the past few days.  Pt reports experiencing a dull epigastric pain 2 days ago which was worse after eating he went to  and was given Omeprazole and sent home,  later called back by  saying Lipase was 736 and that he should go to the ED. Pt  denies history of pancreatitis or gallstone in the past. Pt dose drinks alcohol twice weekly (2-4 drinks), no tobacco, or recreational drugs, he dose use vape. Pt reports currently without N/V/D however reports nausea and one episode of emesis yesterday. Pt reports regular BM in form and color. Currently pt reports minimal pain and denies CP, SOB, N/V/D.       Of note pt was started on Wellbutrin by his Psychiatrist over the past 2 weeks, however was not tolerating it well (Headaches)  and discontinued after experiencing epigastric pain as above.     ED Course:   Vitals:  T 98, HR 80, /91, RR18, SpO2 98% RA  Labs: WBC 10->7, Hb 14, Na 141, K 4.3, BUN/Cr 9/0,8, Ca 9, Tbili 0.4, AST/ALT 25/57, Lipase >250, Lactate 1.4, UA ketone, HIV neg,   EKG: NSR   Images:   - CTAP:  Mild acute interstitial pancreatitis of tail without evidence of necrosis or fluid collection. Perirectal lymphadenopathy of unclear etiology. Cannot exclude infectious/inflammatory or neoplastic etiology.  Interventions: 2L NS IVF, Ofirmev x1  Consults: None

## 2024-10-10 NOTE — H&P ADULT - NSHPPHYSICALEXAM_GEN_ALL_CORE
GENERAL: NAD, lying in bed comfortably  HEAD:  Atraumatic, normocephalic  EYES: EOMI, PERRLA, conjunctiva and sclera clear  NECK:  no JVD  HEART: Regular rate and rhythm, no murmurs, rubs, or gallops  LUNGS: Unlabored respirations.  Clear to auscultation bilaterally, no crackles, wheezing, or rhonchi  ABDOMEN: Soft, nontender, nondistended, +BS  EXTREMITIES: 2+ peripheral pulses bilaterally. No edema  NERVOUS SYSTEM:  A&Ox3, moving all extremities, no focal deficits   SKIN: No rashes or lesions

## 2024-10-11 ENCOUNTER — TRANSCRIPTION ENCOUNTER (OUTPATIENT)
Age: 24
End: 2024-10-11

## 2024-10-11 VITALS
OXYGEN SATURATION: 95 % | HEART RATE: 83 BPM | SYSTOLIC BLOOD PRESSURE: 138 MMHG | TEMPERATURE: 99 F | DIASTOLIC BLOOD PRESSURE: 74 MMHG | RESPIRATION RATE: 16 BRPM

## 2024-10-11 LAB
ALBUMIN SERPL ELPH-MCNC: 3.7 G/DL — SIGNIFICANT CHANGE UP (ref 3.3–5)
ALP SERPL-CCNC: 122 U/L — HIGH (ref 40–120)
ALT FLD-CCNC: 35 U/L — SIGNIFICANT CHANGE UP (ref 10–45)
ANION GAP SERPL CALC-SCNC: 14 MMOL/L — SIGNIFICANT CHANGE UP (ref 5–17)
AST SERPL-CCNC: 19 U/L — SIGNIFICANT CHANGE UP (ref 10–40)
BASOPHILS # BLD AUTO: 0.01 K/UL — SIGNIFICANT CHANGE UP (ref 0–0.2)
BASOPHILS NFR BLD AUTO: 0.1 % — SIGNIFICANT CHANGE UP (ref 0–2)
BILIRUB SERPL-MCNC: 0.4 MG/DL — SIGNIFICANT CHANGE UP (ref 0.2–1.2)
BUN SERPL-MCNC: 7 MG/DL — SIGNIFICANT CHANGE UP (ref 7–23)
CALCIUM SERPL-MCNC: 8.8 MG/DL — SIGNIFICANT CHANGE UP (ref 8.4–10.5)
CHLORIDE SERPL-SCNC: 98 MMOL/L — SIGNIFICANT CHANGE UP (ref 96–108)
CHOLEST SERPL-MCNC: 124 MG/DL — SIGNIFICANT CHANGE UP
CO2 SERPL-SCNC: 24 MMOL/L — SIGNIFICANT CHANGE UP (ref 22–31)
CREAT SERPL-MCNC: 0.87 MG/DL — SIGNIFICANT CHANGE UP (ref 0.5–1.3)
EGFR: 122 ML/MIN/1.73M2 — SIGNIFICANT CHANGE UP
EOSINOPHIL # BLD AUTO: 0.08 K/UL — SIGNIFICANT CHANGE UP (ref 0–0.5)
EOSINOPHIL NFR BLD AUTO: 1.1 % — SIGNIFICANT CHANGE UP (ref 0–6)
GLUCOSE SERPL-MCNC: 94 MG/DL — SIGNIFICANT CHANGE UP (ref 70–99)
HCT VFR BLD CALC: 42.1 % — SIGNIFICANT CHANGE UP (ref 39–50)
HDLC SERPL-MCNC: 24 MG/DL — LOW
HGB BLD-MCNC: 13.7 G/DL — SIGNIFICANT CHANGE UP (ref 13–17)
IMM GRANULOCYTES NFR BLD AUTO: 0.3 % — SIGNIFICANT CHANGE UP (ref 0–0.9)
LIPID PNL WITH DIRECT LDL SERPL: 75 MG/DL — SIGNIFICANT CHANGE UP
LYMPHOCYTES # BLD AUTO: 1.51 K/UL — SIGNIFICANT CHANGE UP (ref 1–3.3)
LYMPHOCYTES # BLD AUTO: 21.2 % — SIGNIFICANT CHANGE UP (ref 13–44)
MAGNESIUM SERPL-MCNC: 2.2 MG/DL — SIGNIFICANT CHANGE UP (ref 1.6–2.6)
MCHC RBC-ENTMCNC: 28.9 PG — SIGNIFICANT CHANGE UP (ref 27–34)
MCHC RBC-ENTMCNC: 32.5 GM/DL — SIGNIFICANT CHANGE UP (ref 32–36)
MCV RBC AUTO: 88.8 FL — SIGNIFICANT CHANGE UP (ref 80–100)
MONOCYTES # BLD AUTO: 0.5 K/UL — SIGNIFICANT CHANGE UP (ref 0–0.9)
MONOCYTES NFR BLD AUTO: 7 % — SIGNIFICANT CHANGE UP (ref 2–14)
NEUTROPHILS # BLD AUTO: 5 K/UL — SIGNIFICANT CHANGE UP (ref 1.8–7.4)
NEUTROPHILS NFR BLD AUTO: 70.3 % — SIGNIFICANT CHANGE UP (ref 43–77)
NON HDL CHOLESTEROL: 100 MG/DL — SIGNIFICANT CHANGE UP
NRBC # BLD: 0 /100 WBCS — SIGNIFICANT CHANGE UP (ref 0–0)
PHOSPHATE SERPL-MCNC: 3.4 MG/DL — SIGNIFICANT CHANGE UP (ref 2.5–4.5)
PLATELET # BLD AUTO: 247 K/UL — SIGNIFICANT CHANGE UP (ref 150–400)
POTASSIUM SERPL-MCNC: 4 MMOL/L — SIGNIFICANT CHANGE UP (ref 3.5–5.3)
POTASSIUM SERPL-SCNC: 4 MMOL/L — SIGNIFICANT CHANGE UP (ref 3.5–5.3)
PROT SERPL-MCNC: 7.2 G/DL — SIGNIFICANT CHANGE UP (ref 6–8.3)
RBC # BLD: 4.74 M/UL — SIGNIFICANT CHANGE UP (ref 4.2–5.8)
RBC # FLD: 12.8 % — SIGNIFICANT CHANGE UP (ref 10.3–14.5)
SODIUM SERPL-SCNC: 136 MMOL/L — SIGNIFICANT CHANGE UP (ref 135–145)
TRIGL SERPL-MCNC: 142 MG/DL — SIGNIFICANT CHANGE UP
WBC # BLD: 7.12 K/UL — SIGNIFICANT CHANGE UP (ref 3.8–10.5)
WBC # FLD AUTO: 7.12 K/UL — SIGNIFICANT CHANGE UP (ref 3.8–10.5)

## 2024-10-11 PROCEDURE — 81001 URINALYSIS AUTO W/SCOPE: CPT

## 2024-10-11 PROCEDURE — 74177 CT ABD & PELVIS W/CONTRAST: CPT | Mod: MC

## 2024-10-11 PROCEDURE — 80061 LIPID PANEL: CPT

## 2024-10-11 PROCEDURE — 84484 ASSAY OF TROPONIN QUANT: CPT

## 2024-10-11 PROCEDURE — 99239 HOSP IP/OBS DSCHRG MGMT >30: CPT | Mod: GC

## 2024-10-11 PROCEDURE — 83605 ASSAY OF LACTIC ACID: CPT

## 2024-10-11 PROCEDURE — 36415 COLL VENOUS BLD VENIPUNCTURE: CPT

## 2024-10-11 PROCEDURE — 83690 ASSAY OF LIPASE: CPT

## 2024-10-11 PROCEDURE — 83735 ASSAY OF MAGNESIUM: CPT

## 2024-10-11 PROCEDURE — 96374 THER/PROPH/DIAG INJ IV PUSH: CPT

## 2024-10-11 PROCEDURE — 84100 ASSAY OF PHOSPHORUS: CPT

## 2024-10-11 PROCEDURE — 86703 HIV-1/HIV-2 1 RESULT ANTBDY: CPT

## 2024-10-11 PROCEDURE — 85025 COMPLETE CBC W/AUTO DIFF WBC: CPT

## 2024-10-11 PROCEDURE — 99285 EMERGENCY DEPT VISIT HI MDM: CPT

## 2024-10-11 PROCEDURE — 80053 COMPREHEN METABOLIC PANEL: CPT

## 2024-10-11 RX ORDER — 5-HYDROXYTRYPTOPHAN (5-HTP) 100 MG
3 TABLET,DISINTEGRATING ORAL ONCE
Refills: 0 | Status: COMPLETED | OUTPATIENT
Start: 2024-10-11 | End: 2024-10-11

## 2024-10-11 RX ORDER — INFLUENZA VIRUS VACCINE 15; 15; 15; 15 UG/.5ML; UG/.5ML; UG/.5ML; UG/.5ML
0.5 SUSPENSION INTRAMUSCULAR ONCE
Refills: 0 | Status: DISCONTINUED | OUTPATIENT
Start: 2024-10-11 | End: 2024-10-11

## 2024-10-11 RX ORDER — 5-HYDROXYTRYPTOPHAN (5-HTP) 100 MG
3 TABLET,DISINTEGRATING ORAL AT BEDTIME
Refills: 0 | Status: DISCONTINUED | OUTPATIENT
Start: 2024-10-11 | End: 2024-10-11

## 2024-10-11 RX ADMIN — Medication 650 MILLIGRAM(S): at 07:16

## 2024-10-11 RX ADMIN — Medication 150 MILLILITER(S): at 00:35

## 2024-10-11 RX ADMIN — Medication 3 MILLIGRAM(S): at 01:28

## 2024-10-11 NOTE — SBIRT NOTE ADULT - NSSBIRTALCPOSREINDET_GEN_A_CORE
SW and patient discussed patient's alcohol usage. Patient reported that he drinks 2-4 times monthly and does not drink more than "4 drinks". Patient denied dependence on alcohol.

## 2024-10-11 NOTE — PROGRESS NOTE ADULT - PROBLEM SELECTOR PLAN 4
F - LR 150ml/h   E - As needed  N - Low fat   D - None   D - 4U
F - LR 150ml/h   E - As needed  N - Low fat   D - None   D - 4U

## 2024-10-11 NOTE — PROGRESS NOTE ADULT - PROBLEM SELECTOR PLAN 1
Pt with 2 days of dull epigastric pain and one episode of emesis yesterday found to have Lipase of 736 at urgent care. Pt  denies history of pancreatitis or gallstone in the past. Pt drinks alcohol twice weekly (2-4 drinks), no tobacco, or recreational drugs,. Pt reports regular BM in form and color. In the ED pt received 2L IVF and Ofirmev. CTAP showing mild acute interstitial pancreatitis involving the pancreatic tail without evidence of necrosis or peripancreatic fluid collection. Lipase >250 in ED. On exam pt in NAD and reports minimal pain and denies CP, SOB, N/V/D. DDx include alcohol vs gallstone vs TG vs drug induced. Pt with mild alcohol use, no gallstone on CT, Unknown TG level and recently started on  Wellbutrin by his Psychiatrist over the past 2 weeks, however was not tolerating it well (Headaches) and discontinued after experiencing epigastric pain as above. Etiology most likely Drug induced vs TG. Pt VSS will start maintenance fluids  as diet as tolerated.     - c/w LR 150ml/h   - Low fat diet as tolerated   - f/u Lipid panel   - hold buproprion, only recently started   - Tylenol for pain PRN
Pt with 2 days of dull epigastric pain and one episode of emesis yesterday found to have Lipase of 736 at urgent care. Pt  denies history of pancreatitis or gallstone in the past. Pt drinks alcohol twice weekly (2-4 drinks), no tobacco, or recreational drugs,. Pt reports regular BM in form and color. In the ED pt received 2L IVF and Ofirmev. CTAP showing mild acute interstitial pancreatitis involving the pancreatic tail without evidence of necrosis or peripancreatic fluid collection. Lipase >250 in ED. On exam pt in NAD and reports minimal pain and denies CP, SOB, N/V/D. DDx include alcohol vs gallstone vs TG vs drug induced. Pt with mild alcohol use, no gallstone on CT, Unknown TG level and recently started on  Wellbutrin by his Psychiatrist over the past 2 weeks, however was not tolerating it well (Headaches) and discontinued after experiencing epigastric pain as above. Etiology most likely Drug induced vs TG. Pt VSS will start maintenance fluids  as diet as tolerated.     - c/w LR 150ml/h   - Low fat diet as tolerated   - f/u Lipid panel   - hold buproprion, only recently started   - Tylenol for pain PRN

## 2024-10-11 NOTE — PROGRESS NOTE ADULT - ASSESSMENT
Pt is a 24-year-old male, with PMHx of GERD and depression/anxiety presenting with 2 days of epigastric found to have elevated lipase and CT finding of acute pancreatitis, admitted for further management.
Pt is a 24-year-old male, with PMHx of GERD and depression/anxiety presenting with 2 days of epigastric found to have elevated lipase and CT finding of acute pancreatitis, admitted for further management.

## 2024-10-11 NOTE — SBIRT NOTE ADULT - NSSBIRTCONCERNEDDRINK_GEN_A_CORE
Left vm for pt returning her call , asking  for the name of medication she is requesting refill for, and to be more specific re : dose increase. It is noted on pt chart there is a pending order for sertraline. If that is the medication to which she is referring, she may need to schedule appt with pcp to approve increase in dose.   No Include Z78.9 (Other Specified Conditions Influencing Health Status) As An Associated Diagnosis?: No Show Applicator Variable?: Yes Medical Necessity Clause: This procedure was medically necessary because the lesions that were treated were: contagious and enlarging Post-Care Instructions: I reviewed with the patient in detail post-care instructions. Patient is to wear sunprotection, and avoid picking at any of the treated lesions. Pt may apply Vaseline to crusted or scabbing areas. Medical Necessity Information: It is in your best interest to select a reason for this procedure from the list below. All of these items fulfill various CMS LCD requirements except the new and changing color options. Spray Paint Text: The liquid nitrogen was applied to the skin utilizing a spray paint frosting technique. Consent: The patient's consent was obtained including but not limited to risks of crusting, scabbing, blistering, scarring, darker or lighter pigmentary change, recurrence, incomplete removal and infection. Detail Level: Detailed

## 2024-10-11 NOTE — DISCHARGE NOTE PROVIDER - ATTENDING DISCHARGE PHYSICAL EXAMINATION:
Gen: sitting upright in bed at time of exam  HEENT: NCAT, MMM, clear OP  Neck: supple, trachea at midline  CV: RRR, +S1/S2  Pulm: adequate respiratory effort, no increased work of breathing  Abd: soft, NTND  Skin: warm and dry, no new rashes vs prior report  Ext: WWP  Neuro: AOx3, no gross focal neurological deficits  Psych: affect and behavior appropriate

## 2024-10-11 NOTE — PROGRESS NOTE ADULT - PROBLEM SELECTOR PLAN 2
Pt with incidental finding of perirectal lymphadenopathy largest node measuring 1 cm in short axis. DDx include infectious/inflammatory or neoplastic. As perirectal LAD does not seem to be a complication of pancreatitis. Pt reports being sexually active with men including anal receptive. Pt denies previous STI himself or in past partners. Currenly pt is not experiencing any symptoms of STIs.     - consider STI testing if symptomatic C/G, HSV, Syphilis   - f/u outpt
Pt with incidental finding of perirectal lymphadenopathy largest node measuring 1 cm in short axis. DDx include infectious/inflammatory or neoplastic. As perirectal LAD does not seem to be a complication of pancreatitis. Pt reports being sexually active with men including anal receptive. Pt denies previous STI himself or in past partners. Currenly pt is not experiencing any symptoms of STIs.     - consider STI testing if symptomatic C/G, HSV, Syphilis   - f/u outpt

## 2024-10-11 NOTE — PROGRESS NOTE ADULT - PROBLEM SELECTOR PLAN 3
Pt with anxiety and depression on Lexapro 20mg qd. follows with psychiatrist.     - c/w Lexapro 20mg qd  - f/u outpt psychiatrist
Pt with anxiety and depression on Lexapro 20mg qd. follows with psychiatrist.     - c/w Lexapro 20mg qd  - f/u outpt psychiatrist

## 2024-10-11 NOTE — DISCHARGE NOTE PROVIDER - HOSPITAL COURSE
Pt is a 24-year-old male, with PMHx of GERD and depression/anxiety presenting with 2 days of epigastric found to have elevated lipase and CT finding of acute pancreatitis, admitted for further management.       Problem/Plan - 1:  ·  Problem: Acute pancreatitis.   ·  Plan: Pt with 2 days of dull epigastric pain and one episode of emesis yesterday found to have Lipase of 736 at urgent care. Pt  denies history of pancreatitis or gallstone in the past. Pt drinks alcohol twice weekly (2-4 drinks), no tobacco, or recreational drugs,. Pt reports regular BM in form and color. In the ED pt received 2L IVF and Ofirmev. CTAP showing mild acute interstitial pancreatitis involving the pancreatic tail without evidence of necrosis or peripancreatic fluid collection. Lipase >250 in ED. On exam pt in NAD and reports minimal pain and denies CP, SOB, N/V/D. DDx include alcohol vs gallstone vs TG vs drug induced. Pt with mild alcohol use, no gallstone on CT, Unknown TG level and recently started on  Wellbutrin by his Psychiatrist over the past 2 weeks, however was not tolerating it well (Headaches) and discontinued after experiencing epigastric pain as above. Etiology most likely Drug induced vs TG. Pt VSS will start maintenance fluids  as diet as tolerated.     - c/w LR 150ml/h   - Low fat diet as tolerated   - f/u Lipid panel   - hold buproprion, only recently started   - Tylenol for pain PRN.     Problem/Plan - 2:  ·  Problem: Lymphadenopathy of other site.   ·  Plan: Pt with incidental finding of perirectal lymphadenopathy largest node measuring 1 cm in short axis. DDx include infectious/inflammatory or neoplastic. As perirectal LAD does not seem to be a complication of pancreatitis. Pt reports being sexually active with men including anal receptive. Pt denies previous STI himself or in past partners. Currenly pt is not experiencing any symptoms of STIs.     - consider STI testing if symptomatic C/G, HSV, Syphilis   - f/u outpt.     Problem/Plan - 3:  ·  Problem: Anxiety and depression.   ·  Plan: Pt with anxiety and depression on Lexapro 20mg qd. follows with psychiatrist.     - c/w Lexapro 20mg qd  - f/u outpt psychiatrist.   Pt is a 24-year-old male, with PMHx of GERD and depression/anxiety presenting with 2 days of epigastric found to have elevated lipase and CT finding of acute pancreatitis, admitted for further management.      #Problem 1: Acute pancreatitis. Patient came in with 2 days of dull epigastric pain and one episode of emesis yesterday found to have Lipase of 736 at urgent care. No previous hx of pancreatitis or gallstone in the past. Pt drinks alcohol twice weekly (2-4 drinks), no tobacco, or recreational drugs. In the ED pt received 2L IVF and tylenol . CTAP showing mild acute interstitial pancreatitis involving the pancreatic tail without evidence of necrosis or peripancreatic fluid collection. Patient was give Etiology most likely drug induced given triglycerides are within normal limits. Patient was treated with LR fluids overnight and tylenol for pain. Pancreatitis likely drug induced (Buproprion).        # Problem/Plan - 2:Problem: Lymphadenopathy of other site. Pt with incidental finding of perirectal lymphadenopathy largest node measuring 1 cm in short axis. DDx include infectious/inflammatory or neoplastic. As perirectal LAD does not seem to be a complication of pancreatitis. Pt reports being sexually active with men including anal receptive. Pt denies previous STI himself or in past partners. Currently pt is not experiencing any symptoms of STIs     #Problem/Plan - 3:Problem: Anxiety and depression. Given acute pancreatis diagnosis likely due to buproprion recommends follow with outpatient psychiatrist for new medication recommendations Pt is a 24-year-old male, with PMHx of GERD and depression/anxiety presenting with 2 days of epigastric found to have elevated lipase and CT finding of acute pancreatitis, admitted for further management.    Acute pancreatitis. Patient came in with 2 days of dull epigastric pain and one episode of emesis yesterday found to have Lipase of 736 at urgent care. No previous hx of pancreatitis or gallstone in the past. Pt drinks alcohol twice weekly (2-4 drinks), no tobacco, or recreational drugs. In the ED pt received 2L IVF and tylenol . CTAP showing mild acute interstitial pancreatitis involving the pancreatic tail without evidence of necrosis or peripancreatic fluid collection. Patient was give Etiology most likely drug induced given triglycerides are within normal limits. Patient was treated with LR fluids overnight and tylenol for pain. Pancreatitis likely drug induced (Buproprion) as lipid panel wnl, pt denies excess alcohol use. Patient comfortable and denies abd pain on DC. Tolerating full diet.  - Stop buproprion  - F/u PCP and psychiatrist    Problem: Lymphadenopathy of other site. Pt with incidental finding of perirectal lymphadenopathy largest node measuring 1 cm in short axis. DDx include infectious/inflammatory or neoplastic. As perirectal LAD does not seem to be a complication of pancreatitis. Pt reports being sexually active with men including anal receptive. Pt denies previous STI himself or in past partners. Currently pt is not experiencing any symptoms of STIs. HIV screen negative  - Collected urine and rectal chlamydia gonorrhea swab. Will f/u with results  - F/u with PCP    Anxiety and depression. Given acute pancreatis diagnosis likely due to buproprion recommends follow with outpatient psychiatrist for new medication recommendations    Meds to start: none  Meds to stop: Wellbutrin  Items to follow up on: STI screening, rectal lymphadenopathy    Physical exam on dc: Pt is a 24-year-old male, with PMHx of GERD and depression/anxiety presenting with 2 days of epigastric found to have elevated lipase and CT finding of acute pancreatitis, admitted for further management.    Acute pancreatitis. Patient came in with 2 days of dull epigastric pain and one episode of emesis yesterday found to have Lipase of 736 at urgent care. No previous hx of pancreatitis or gallstone in the past. Pt drinks alcohol twice weekly (2-4 drinks), no tobacco, or recreational drugs. In the ED pt received 2L IVF and tylenol . CTAP showing mild acute interstitial pancreatitis involving the pancreatic tail without evidence of necrosis or peripancreatic fluid collection. Patient was give Etiology most likely drug induced given triglycerides are within normal limits. Patient was treated with LR fluids overnight and tylenol for pain. Pancreatitis likely drug induced (Buproprion) as lipid panel wnl, pt denies excess alcohol use. Patient comfortable and denies abd pain on DC. Tolerating full diet.  - Stop buproprion  - F/u PCP and psychiatrist    Problem: Lymphadenopathy of other site. Pt with incidental finding of perirectal lymphadenopathy largest node measuring 1 cm in short axis. DDx include infectious/inflammatory or neoplastic. As perirectal LAD does not seem to be a complication of pancreatitis. Pt reports being sexually active with men including anal receptive. Pt denies previous STI himself or in past partners. Currently pt is not experiencing any symptoms of STIs. HIV screen negative  - Collected urine and rectal chlamydia gonorrhea swab. Will f/u with results  - F/u with PCP    Anxiety and depression. Given acute pancreatis diagnosis likely due to buproprion recommends follow with outpatient psychiatrist for new medication recommendations    Meds to start: none  Meds to stop: Wellbutrin  Items to follow up on: STI screening, rectal lymphadenopathy    Physical exam on dc:   General: NAD  HEENT: NC/AT; PERRL, anicteric sclera; MMM  Neck: supple  Cardiovascular: +S1/S2; RRR  Respiratory: CTA B/L; no W/R/R  Gastrointestinal: soft, NT/ND; +BSx4  Extremities: WWP; no edema, clubbing or cyanosis  Vascular: 2+ radial, DP/PT pulses B/L  Neurological: AAOx3; no focal deficits   Pt is a 24-year-old male, with PMHx of GERD and depression/anxiety presenting with 2 days of epigastric found to have elevated lipase and CT finding of acute pancreatitis, admitted for further management.    Acute pancreatitis. Patient came in with 2 days of dull epigastric pain and one episode of emesis yesterday found to have Lipase of 736 at urgent care. No previous hx of pancreatitis or gallstone in the past. Pt drinks alcohol twice weekly (2-4 drinks), no tobacco, or recreational drugs. In the ED pt received 2L IVF and tylenol . CTAP showing mild acute interstitial pancreatitis involving the pancreatic tail without evidence of necrosis or peripancreatic fluid collection. Patient was give Etiology most likely drug induced given triglycerides are within normal limits. Patient was treated with LR fluids overnight and tylenol for pain. Pancreatitis likely drug induced (Buproprion) as lipid panel wnl, pt denies excess alcohol use. Patient comfortable and denies abd pain on DC. Tolerating full diet.  - Ptn self-dced buproprion for the last week given gradual onset of epigastric pain after starting medication, continue to stop bupropion on discharge  - F/u PCP and psychiatrist for alternative med    Problem: Lymphadenopathy of other site. Pt with incidental finding of perirectal lymphadenopathy largest node measuring 1 cm in short axis. DDx include infectious/inflammatory or neoplastic. As perirectal LAD does not seem to be a complication of pancreatitis. Pt reports being sexually active with men including anal receptive. Pt denies previous STI himself or in past partners. Currently pt is not experiencing any symptoms of STIs. HIV screen negative  - Collected urine and rectal chlamydia gonorrhea swab. Will f/u with results  - F/u with PCP    Anxiety and depression. Given acute pancreatis diagnosis likely due to buproprion recommends follow with outpatient psychiatrist for new medication recommendations    Meds to start: none  Meds to stop: Wellbutrin  Items to follow up on: STI screening, rectal lymphadenopathy    Physical exam on dc:   General: NAD  HEENT: NC/AT; PERRL, anicteric sclera; MMM  Neck: supple  Cardiovascular: +S1/S2; RRR  Respiratory: CTA B/L; no W/R/R  Gastrointestinal: soft, NT/ND; +BSx4  Extremities: WWP; no edema, clubbing or cyanosis  Vascular: 2+ radial, DP/PT pulses B/L  Neurological: AAOx3; no focal deficits

## 2024-10-11 NOTE — DISCHARGE NOTE NURSING/CASE MANAGEMENT/SOCIAL WORK - NSDCVIVACCINE_GEN_ALL_CORE_FT
Tdap; 10-Aug-2024 13:24; Ria Valente (SAEED); [employer]; [id number] (Exp. Date: 01-Apr-2026); IntraMuscular; Deltoid Left.; 0.5 milliLiter(s); VIS (VIS Published: 09-May-2013, VIS Presented: 10-Aug-2024);

## 2024-10-11 NOTE — SBIRT NOTE ADULT - NSSBIRTDRGBRIEFINTDET_GEN_A_CORE
Patient reported that he engages in marijuana use, but does not frequently uses the substance. Patient denied guilt about his usage.

## 2024-10-11 NOTE — PROGRESS NOTE ADULT - SUBJECTIVE AND OBJECTIVE BOX
OVERNIGHT EVENTS:    SUBJECTIVE / INTERVAL HPI: Patient seen and examined at bedside.     VITAL SIGNS:  Vital Signs Last 24 Hrs  T(C): 37.2 (11 Oct 2024 05:41), Max: 37.2 (11 Oct 2024 05:41)  T(F): 99 (11 Oct 2024 05:41), Max: 99 (11 Oct 2024 05:41)  HR: 83 (11 Oct 2024 05:41) (70 - 89)  BP: 138/74 (11 Oct 2024 05:41) (126/83 - 155/94)  BP(mean): --  RR: 16 (11 Oct 2024 05:41) (16 - 18)  SpO2: 95% (11 Oct 2024 05:41) (95% - 98%)    Parameters below as of 11 Oct 2024 05:41  Patient On (Oxygen Delivery Method): room air      I&O's Summary      PHYSICAL EXAM:    General: NAD  HEENT: NC/AT; PERRL, anicteric sclera; MMM  Neck: supple  Cardiovascular: +S1/S2; RRR  Respiratory: CTA B/L; no W/R/R  Gastrointestinal: soft, NT/ND; +BSx4  Extremities: WWP; no edema, clubbing or cyanosis  Vascular: 2+ radial, DP/PT pulses B/L  Neurological: AAOx3; no focal deficits    MEDICATIONS:  MEDICATIONS  (STANDING):  escitalopram 20 milliGRAM(s) Oral daily  influenza   Vaccine 0.5 milliLiter(s) IntraMuscular once  lactated ringers. 1000 milliLiter(s) (150 mL/Hr) IV Continuous <Continuous>  melatonin 3 milliGRAM(s) Oral at bedtime    MEDICATIONS  (PRN):  acetaminophen     Tablet .. 650 milliGRAM(s) Oral every 6 hours PRN Temp greater or equal to 38C (100.4F), Mild Pain (1 - 3)  aluminum hydroxide/magnesium hydroxide/simethicone Suspension 30 milliLiter(s) Oral every 4 hours PRN Dyspepsia  ondansetron Injectable 4 milliGRAM(s) IV Push every 8 hours PRN Nausea and/or Vomiting      ALLERGIES:  Allergies    No Known Allergies    Intolerances        LABS:                        14.7   7.82  )-----------( 284      ( 10 Oct 2024 13:37 )             46.4     10-10    141  |  103  |  9   ----------------------------<  96  4.3   |  29  |  0.79    Ca    9.2      10 Oct 2024 13:37    TPro  7.7  /  Alb  3.5  /  TBili  0.4  /  DBili  x   /  AST  25  /  ALT  57[H]  /  AlkPhos  114  10-10      Urinalysis Basic - ( 10 Oct 2024 13:37 )    Color: Yellow / Appearance: Cloudy / S.025 / pH: x  Gluc: 96 mg/dL / Ketone: 15 mg/dL  / Bili: Negative / Urobili: 1.0 mg/dL   Blood: x / Protein: Negative mg/dL / Nitrite: Negative   Leuk Esterase: Negative / RBC: 1 /HPF / WBC 1 /HPF   Sq Epi: x / Non Sq Epi: x / Bacteria: Negative /HPF      CAPILLARY BLOOD GLUCOSE          RADIOLOGY & ADDITIONAL TESTS: Reviewed.  
OVERNIGHT EVENTS:    SUBJECTIVE / INTERVAL HPI: Patient seen and examined at bedside.     VITAL SIGNS:  Vital Signs Last 24 Hrs  T(C): 37.2 (11 Oct 2024 05:41), Max: 37.2 (11 Oct 2024 05:41)  T(F): 99 (11 Oct 2024 05:41), Max: 99 (11 Oct 2024 05:41)  HR: 83 (11 Oct 2024 05:41) (70 - 89)  BP: 138/74 (11 Oct 2024 05:41) (126/83 - 155/94)  BP(mean): --  RR: 16 (11 Oct 2024 05:41) (16 - 18)  SpO2: 95% (11 Oct 2024 05:41) (95% - 98%)    Parameters below as of 11 Oct 2024 05:41  Patient On (Oxygen Delivery Method): room air      I&O's Summary      PHYSICAL EXAM:    General: NAD  HEENT: NC/AT; PERRL, anicteric sclera; MMM  Neck: supple  Cardiovascular: +S1/S2; RRR  Respiratory: CTA B/L; no W/R/R  Gastrointestinal: soft, NT/ND; +BSx4  Extremities: WWP; no edema, clubbing or cyanosis  Vascular: 2+ radial, DP/PT pulses B/L  Neurological: AAOx3; no focal deficits    MEDICATIONS:  MEDICATIONS  (STANDING):  escitalopram 20 milliGRAM(s) Oral daily  influenza   Vaccine 0.5 milliLiter(s) IntraMuscular once  lactated ringers. 1000 milliLiter(s) (150 mL/Hr) IV Continuous <Continuous>  melatonin 3 milliGRAM(s) Oral at bedtime    MEDICATIONS  (PRN):  acetaminophen     Tablet .. 650 milliGRAM(s) Oral every 6 hours PRN Temp greater or equal to 38C (100.4F), Mild Pain (1 - 3)  aluminum hydroxide/magnesium hydroxide/simethicone Suspension 30 milliLiter(s) Oral every 4 hours PRN Dyspepsia  ondansetron Injectable 4 milliGRAM(s) IV Push every 8 hours PRN Nausea and/or Vomiting      ALLERGIES:  Allergies    No Known Allergies    Intolerances        LABS:                        14.7   7.82  )-----------( 284      ( 10 Oct 2024 13:37 )             46.4     10-10    141  |  103  |  9   ----------------------------<  96  4.3   |  29  |  0.79    Ca    9.2      10 Oct 2024 13:37    TPro  7.7  /  Alb  3.5  /  TBili  0.4  /  DBili  x   /  AST  25  /  ALT  57[H]  /  AlkPhos  114  10-10      Urinalysis Basic - ( 10 Oct 2024 13:37 )    Color: Yellow / Appearance: Cloudy / S.025 / pH: x  Gluc: 96 mg/dL / Ketone: 15 mg/dL  / Bili: Negative / Urobili: 1.0 mg/dL   Blood: x / Protein: Negative mg/dL / Nitrite: Negative   Leuk Esterase: Negative / RBC: 1 /HPF / WBC 1 /HPF   Sq Epi: x / Non Sq Epi: x / Bacteria: Negative /HPF      CAPILLARY BLOOD GLUCOSE          RADIOLOGY & ADDITIONAL TESTS: Reviewed.

## 2024-10-11 NOTE — DISCHARGE NOTE NURSING/CASE MANAGEMENT/SOCIAL WORK - PATIENT PORTAL LINK FT
You can access the FollowMyHealth Patient Portal offered by [facility] by registering at the following website: [email or web address]. By joining [employer]’s FollowMyHealth portal, you will also be able to view your health information using other applications (apps) compatible with our system.

## 2024-10-11 NOTE — PATIENT PROFILE ADULT - FALL HARM RISK - RISK INTERVENTIONS

## 2024-10-19 DIAGNOSIS — K21.9 GASTRO-ESOPHAGEAL REFLUX DISEASE WITHOUT ESOPHAGITIS: ICD-10-CM

## 2024-10-19 DIAGNOSIS — K85.90 ACUTE PANCREATITIS WITHOUT NECROSIS OR INFECTION, UNSPECIFIED: ICD-10-CM

## 2024-10-19 DIAGNOSIS — R59.9 ENLARGED LYMPH NODES, UNSPECIFIED: ICD-10-CM

## 2024-10-19 DIAGNOSIS — T43.295A ADVERSE EFFECT OF OTHER ANTIDEPRESSANTS, INITIAL ENCOUNTER: ICD-10-CM

## 2024-10-19 DIAGNOSIS — F41.9 ANXIETY DISORDER, UNSPECIFIED: ICD-10-CM

## 2024-10-19 DIAGNOSIS — F32.A DEPRESSION, UNSPECIFIED: ICD-10-CM

## 2024-10-19 DIAGNOSIS — F10.90 ALCOHOL USE, UNSPECIFIED, UNCOMPLICATED: ICD-10-CM

## 2024-10-19 DIAGNOSIS — G47.00 INSOMNIA, UNSPECIFIED: ICD-10-CM

## 2025-06-09 ENCOUNTER — EMERGENCY (EMERGENCY)
Facility: HOSPITAL | Age: 25
LOS: 1 days | End: 2025-06-09
Attending: STUDENT IN AN ORGANIZED HEALTH CARE EDUCATION/TRAINING PROGRAM | Admitting: STUDENT IN AN ORGANIZED HEALTH CARE EDUCATION/TRAINING PROGRAM
Payer: COMMERCIAL

## 2025-06-09 VITALS
WEIGHT: 279.99 LBS | TEMPERATURE: 98 F | SYSTOLIC BLOOD PRESSURE: 135 MMHG | HEART RATE: 85 BPM | HEIGHT: 68 IN | RESPIRATION RATE: 18 BRPM | OXYGEN SATURATION: 98 % | DIASTOLIC BLOOD PRESSURE: 81 MMHG

## 2025-06-09 VITALS
OXYGEN SATURATION: 98 % | TEMPERATURE: 98 F | DIASTOLIC BLOOD PRESSURE: 91 MMHG | RESPIRATION RATE: 16 BRPM | SYSTOLIC BLOOD PRESSURE: 152 MMHG | HEART RATE: 68 BPM

## 2025-06-09 LAB
ALBUMIN SERPL ELPH-MCNC: 4.3 G/DL — SIGNIFICANT CHANGE UP (ref 3.3–5)
ALP SERPL-CCNC: 129 U/L — HIGH (ref 40–120)
ALT FLD-CCNC: 99 U/L — HIGH (ref 10–45)
ANION GAP SERPL CALC-SCNC: 15 MMOL/L — SIGNIFICANT CHANGE UP (ref 5–17)
AST SERPL-CCNC: 67 U/L — HIGH (ref 10–40)
BASOPHILS # BLD AUTO: 0.03 K/UL — SIGNIFICANT CHANGE UP (ref 0–0.2)
BASOPHILS NFR BLD AUTO: 0.3 % — SIGNIFICANT CHANGE UP (ref 0–2)
BILIRUB SERPL-MCNC: 0.5 MG/DL — SIGNIFICANT CHANGE UP (ref 0.2–1.2)
BUN SERPL-MCNC: 12 MG/DL — SIGNIFICANT CHANGE UP (ref 7–23)
CALCIUM SERPL-MCNC: 9.7 MG/DL — SIGNIFICANT CHANGE UP (ref 8.4–10.5)
CHLORIDE SERPL-SCNC: 102 MMOL/L — SIGNIFICANT CHANGE UP (ref 96–108)
CO2 SERPL-SCNC: 20 MMOL/L — LOW (ref 22–31)
CREAT SERPL-MCNC: 0.68 MG/DL — SIGNIFICANT CHANGE UP (ref 0.5–1.3)
EGFR: 131 ML/MIN/1.73M2 — SIGNIFICANT CHANGE UP
EGFR: 131 ML/MIN/1.73M2 — SIGNIFICANT CHANGE UP
EOSINOPHIL # BLD AUTO: 0.2 K/UL — SIGNIFICANT CHANGE UP (ref 0–0.5)
EOSINOPHIL NFR BLD AUTO: 1.8 % — SIGNIFICANT CHANGE UP (ref 0–6)
GLUCOSE SERPL-MCNC: 98 MG/DL — SIGNIFICANT CHANGE UP (ref 70–99)
HCT VFR BLD CALC: 45.8 % — SIGNIFICANT CHANGE UP (ref 39–50)
HGB BLD-MCNC: 15.2 G/DL — SIGNIFICANT CHANGE UP (ref 13–17)
IMM GRANULOCYTES NFR BLD AUTO: 0.2 % — SIGNIFICANT CHANGE UP (ref 0–0.9)
LIDOCAIN IGE QN: 13 U/L — SIGNIFICANT CHANGE UP (ref 7–60)
LYMPHOCYTES # BLD AUTO: 19.3 % — SIGNIFICANT CHANGE UP (ref 13–44)
LYMPHOCYTES # BLD AUTO: 2.15 K/UL — SIGNIFICANT CHANGE UP (ref 1–3.3)
MCHC RBC-ENTMCNC: 29 PG — SIGNIFICANT CHANGE UP (ref 27–34)
MCHC RBC-ENTMCNC: 33.2 G/DL — SIGNIFICANT CHANGE UP (ref 32–36)
MCV RBC AUTO: 87.4 FL — SIGNIFICANT CHANGE UP (ref 80–100)
MONOCYTES # BLD AUTO: 0.6 K/UL — SIGNIFICANT CHANGE UP (ref 0–0.9)
MONOCYTES NFR BLD AUTO: 5.4 % — SIGNIFICANT CHANGE UP (ref 2–14)
NEUTROPHILS # BLD AUTO: 8.14 K/UL — HIGH (ref 1.8–7.4)
NEUTROPHILS NFR BLD AUTO: 73 % — SIGNIFICANT CHANGE UP (ref 43–77)
NRBC BLD AUTO-RTO: 0 /100 WBCS — SIGNIFICANT CHANGE UP (ref 0–0)
PLATELET # BLD AUTO: 258 K/UL — SIGNIFICANT CHANGE UP (ref 150–400)
POTASSIUM SERPL-MCNC: 4.6 MMOL/L — SIGNIFICANT CHANGE UP (ref 3.5–5.3)
POTASSIUM SERPL-SCNC: 4.6 MMOL/L — SIGNIFICANT CHANGE UP (ref 3.5–5.3)
PROT SERPL-MCNC: 7.5 G/DL — SIGNIFICANT CHANGE UP (ref 6–8.3)
RBC # BLD: 5.24 M/UL — SIGNIFICANT CHANGE UP (ref 4.2–5.8)
RBC # FLD: 13.6 % — SIGNIFICANT CHANGE UP (ref 10.3–14.5)
SODIUM SERPL-SCNC: 137 MMOL/L — SIGNIFICANT CHANGE UP (ref 135–145)
WBC # BLD: 11.14 K/UL — HIGH (ref 3.8–10.5)
WBC # FLD AUTO: 11.14 K/UL — HIGH (ref 3.8–10.5)

## 2025-06-09 PROCEDURE — 99284 EMERGENCY DEPT VISIT MOD MDM: CPT

## 2025-06-09 PROCEDURE — 80053 COMPREHEN METABOLIC PANEL: CPT

## 2025-06-09 PROCEDURE — 36415 COLL VENOUS BLD VENIPUNCTURE: CPT

## 2025-06-09 PROCEDURE — 85025 COMPLETE CBC W/AUTO DIFF WBC: CPT

## 2025-06-09 PROCEDURE — 96374 THER/PROPH/DIAG INJ IV PUSH: CPT

## 2025-06-09 PROCEDURE — 76705 ECHO EXAM OF ABDOMEN: CPT | Mod: 26

## 2025-06-09 PROCEDURE — 99284 EMERGENCY DEPT VISIT MOD MDM: CPT | Mod: 25

## 2025-06-09 PROCEDURE — 76705 ECHO EXAM OF ABDOMEN: CPT

## 2025-06-09 PROCEDURE — 83690 ASSAY OF LIPASE: CPT

## 2025-06-09 RX ORDER — ONDANSETRON HCL/PF 4 MG/2 ML
4 VIAL (ML) INJECTION ONCE
Refills: 0 | Status: COMPLETED | OUTPATIENT
Start: 2025-06-09 | End: 2025-06-09

## 2025-06-09 RX ORDER — MAGNESIUM, ALUMINUM HYDROXIDE 200-200 MG
30 TABLET,CHEWABLE ORAL ONCE
Refills: 0 | Status: COMPLETED | OUTPATIENT
Start: 2025-06-09 | End: 2025-06-09

## 2025-06-09 RX ADMIN — Medication 30 MILLILITER(S): at 11:58

## 2025-06-09 RX ADMIN — Medication 4 MILLIGRAM(S): at 11:58

## 2025-06-09 NOTE — ED ADULT NURSE NOTE - HOW PATIENT ADDRESSED, PROFILE
Quality 431: Preventive Care And Screening: Unhealthy Alcohol Use - Screening: Patient not identified as an unhealthy alcohol user when screened for unhealthy alcohol use using a systematic screening method Quality 226: Preventive Care And Screening: Tobacco Use: Screening And Cessation Intervention: Patient screened for tobacco use and is an ex/non-smoker Detail Level: Detailed Quality 130: Documentation Of Current Medications In The Medical Record: Current Medications Documented Tomasz

## 2025-06-09 NOTE — ED ADULT NURSE NOTE - OBJECTIVE STATEMENT
Pt presents to the ED with complaints of epigastric pain radiating to the back that started this morning. Pt states he has a history of pancreatitis, pain feels similar. On arrival, pt is alert and oriented, ambulatory, states pain is much better.   Pt is changed into a gown for assessment.

## 2025-06-09 NOTE — ED PROVIDER NOTE - ATTENDING APP SHARED VISIT CONTRIBUTION OF CARE
Patient present Emergency Department for intermittent abdominal pain and vomiting history of prior pancreatitis.  Patient now asymptomatic soft abdomen nontender.  Will send basics ultrasound rule out stones.  If normal can likely discharge patient home with outpatient follow-up.

## 2025-06-09 NOTE — ED ADULT TRIAGE NOTE - HEIGHT IN CM
[address]  [place] [address]  Phone: [phone]  Fax: [phone]            March 11, 2021     [address] [place] [address]      Dear Rosemary Burdick: We are sorry that you missed your appointment with Dr. Cori Hansen on 03/08/21. Your health and follow-up medical care are important to us. Please call our office as soon as possible so that we may reschedule your appointment. If you have already rescheduled your appointment, please disregard this letter.     Sincerely,        Dr. Ashley Sullivan B.LPN
[phone]

## 2025-06-09 NOTE — ED ADULT NURSE NOTE - NSFALLRISKASMTTYPE_ED_ALL_ED
Initial (On Arrival) Macrocytic anemia, Hb stable at baseline, no signs of active bleeding. With leukopenia. Recent diagnosis of MDS by outpatient heme.  -Recent b12 and folate levels in 12/2019 wnl

## 2025-06-09 NOTE — ED PROVIDER NOTE - NSFOLLOWUPINSTRUCTIONS_ED_ALL_ED_FT
[place] Diet  A bland diet may consist of soft foods or foods that are not high in fat or are not greasy, acidic, or spicy. Avoiding certain foods may cause less irritation to your mouth, throat, stomach, or gastrointestinal tract. Avoiding certain foods may make you feel better. Everyone's tolerances are different. A bland diet should be based on what you can tolerate and what may cause discomfort.    What is my plan?  Your health care provider or dietitian may recommend specific changes to your diet to treat your symptoms. These changes may include:  Eating small meals frequently.  Cooking food until it is soft enough to chew easily.  Taking the time to chew your food thoroughly, so it is easy to swallow and digest.  Avoiding foods that cause you discomfort. These may include spicy food, fried food, greasy foods, hard-to-chew foods, or citrus fruits and juices.  Drinking slowly.  What are tips for following this plan?  Reading food labels    To reduce fiber intake, look for food labels that say "whole," such as whole wheat or whole grain.  Shopping    Avoid food items that may have nuts or seeds.  Avoid vegetables that may make you gassy or have a tough texture, such as broccoli, cauliflower, or corn.  Cooking    Cook foods thoroughly so they have a soft texture.  Meal planning    Make sure you include foods from all food groups to eat a balanced diet.  Eat a variety of types of foods.  Eat foods and drink beverages that do not cause you discomfort. These may include soups and broths with cooked meats, pasta, and vegetables.  Lifestyle    Sit up after meals, avoid tight clothing, and take time to eat and chew your food slowly.  Ask your health care provider whether you should take dietary supplements.  General information    Mildly season your foods. Some seasonings, such as cayenne pepper, vinegar, or hot sauce, may cause irritation.  The foods, beverages, or seasonings to avoid should be based on individual tolerance.  What foods should I eat?  Fruits    Canned or cooked fruit such as peaches, pears, or applesauce. Bananas.    Vegetables    Well-cooked vegetables. Canned or cooked vegetables such as carrots, green beans, beets, or spinach. Mashed or boiled potatoes.    Grains    Bread, rice, and cereal from the grain group.  Hot cereals, such as cream of wheat and processed oatmeal. Rice. Bread, crackers, pasta, or tortillas made from refined white flour.    Meats and other proteins    Chicken, fish, and eggs.  Eggs. Creamy peanut butter or other nut butters. Lean, well-cooked tender meats, such as beef, pork, chicken, or fish.    Dairy    Low-fat dairy products such as milk, cottage cheese, or yogurt.    Beverages    A cup of hot tea.  Water. Herbal tea. Apple juice.    Fats and oils    Mild salad dressings. Canola or olive oil.    Sweets and desserts    Low-fat pudding, custard, or ice cream. Fruit gelatin.    The items listed above may not be a complete list of foods and beverages you can eat. Contact a dietitian for more information.    What foods should I avoid?  Fruits    Citrus fruits, such as oranges and grapefruit. Fruits with a stringy texture. Fruits that have lots of seeds, such as kiwi or strawberries. Dried fruits.    Vegetables    Raw, uncooked vegetables. Salads.    Grains    Whole grain breads, muffins, and cereals.    Meats and other proteins    Tough, fibrous meats. Highly seasoned meat such as corned beef, smoked meats, or fish. Processed high-fat meats such as brats, hot dogs, or sausage.    Dairy    Full-fat dairy foods such as ice cream and cheese.    Beverages    Caffeinated drinks. Alcohol.    Seasonings and condiments    Strongly flavored seasonings or condiments. Hot sauce. Salsa.    Other foods    Spicy foods. Fried or greasy foods. Sour foods, such as pickled or fermented foods like sauerkraut. Foods high in fiber.    The items listed above may not be a complete list of foods and beverages you should avoid. Contact a dietitian for more information.    Summary  A bland diet should be based on individual tolerance. It may consist of foods that are soft textured and do not have a lot of fat, fiber, acid, or seasonings.  A bland diet may be recommended because avoiding certain foods, beverages, or spices may make you feel better.  This information is not intended to replace advice given to you by your health care provider. Make sure you discuss any questions you have with your health care provider.

## 2025-06-09 NOTE — ED PROVIDER NOTE - CLINICAL SUMMARY MEDICAL DECISION MAKING FREE TEXT BOX
26 y/o m hx anxiety/depression, pancreatitis 2024 presents c/o epigastric abd pain and 1 episode of vomiting today, sx resolved prior to evaluation in ED.  Vitals unremarkable, nontender abd on exam.  Labs with normal lipase, noted mildly elevated AST/ALT and alk phos so offered abd u/s to r/o gallstones which pt will have done while in ED.   F/u results and reassess.

## 2025-06-09 NOTE — ED PROVIDER NOTE - PATIENT PORTAL LINK FT
You can access the FollowMyHealth Patient Portal offered by [facility] by registering at the following website: [email or web address]. By joining [employer]’s FollowMyHealth portal, you will also be able to view your health information using other applications (apps) compatible with our system.

## 2025-06-09 NOTE — ED PROVIDER NOTE - CARE PROVIDER_API CALL
Missy Esquivel  Gastroenterology  [address]  [place] [address]  Phone: [phone]  Fax: [phone]  Follow Up Time:

## 2025-06-09 NOTE — ED PROVIDER NOTE - OBJECTIVE STATEMENT
26 y/o m hx anxiety/depression, pancreatitis 2024 presents c/o epigastric abd pain and 1 episode of vomiting this morning.  Pt stating the pain reminded him of his episode of pancreatitis so decided to come to ED but reports all sx have since resolved.  Pt reports no pain or nausea currently.  Denies fever, chills, diarrhea, CP, SOB, dysuria, all other ROS negative.

## 2025-06-09 NOTE — ED PROVIDER NOTE - PROGRESS NOTE DETAILS
present u/s shows no acute findings, stable for dc, f/u pmd to trend LFTs, return to ED if sx worsen.

## 2025-06-12 DIAGNOSIS — F32.A DEPRESSION, UNSPECIFIED: ICD-10-CM

## 2025-06-12 DIAGNOSIS — R10.13 EPIGASTRIC PAIN: ICD-10-CM

## 2025-06-12 DIAGNOSIS — R74.01 ELEVATION OF LEVELS OF LIVER TRANSAMINASE LEVELS: ICD-10-CM

## 2025-06-12 DIAGNOSIS — R11.10 VOMITING, UNSPECIFIED: ICD-10-CM

## 2025-06-12 DIAGNOSIS — Z87.19 PERSONAL HISTORY OF OTHER DISEASES OF THE DIGESTIVE SYSTEM: ICD-10-CM

## 2025-06-12 DIAGNOSIS — R74.8 ABNORMAL LEVELS OF OTHER SERUM ENZYMES: ICD-10-CM

## 2025-07-15 NOTE — DISCHARGE NOTE PROVIDER - NSDCCPCAREPLAN_GEN_ALL_CORE_FT
Virtual ICU Quality Rounds    Admit Date: 7/14/2025  Hospital Day: 0    ICU Day: 14h    24H Vital Sign Range:  Temp:  [98.1 °F (36.7 °C)-99.1 °F (37.3 °C)]   Pulse:  [57-83]   Resp:  [15-29]   BP: (104-136)/(53-82)   SpO2:  [95 %-100 %]     VICU Surveillance Screening  LDA reconciliation : Yes  Nursing orders placed : IP [id number] Peripheral IV Access     PRINCIPAL DISCHARGE DIAGNOSIS  Diagnosis: Pancreatitis  Assessment and Plan of Treatment: You were in the hospital because you have pancreatitis. This is a swelling of the pancreas. You were treated with IV fluids and had a restricted diet.   DO NOT drink any alcohol. Avoiding alcohol, tobacco, and foods that make your symptoms worse is the first step to controlling pain.    If the pain is getting worse, take your pain medicine to help before the pain becomes very bad.  Please discontinue your wellbutrin. Follow up with psychiatrist in 1-2 weeks.     Call your provider if you have:   •Very bad pain that is not relieved by over-the-counter drugs   •Problems eating, drinking, or taking your drugs because of nausea or vomiting   •Problems breathing or a very fast heartbeat   •Pain with fever, chills, frequent vomiting, or with feeling faint, weak, or tired   •Weight loss or problems digesting your food   •Yellow color to your skin and the whites of your eyes (jaundice)

## 2025-08-04 NOTE — DISCHARGE NOTE PROVIDER - NSDCCAREPROVSEEN_GEN_ALL_CORE_FT
Instructions: This plan will send the code FBSE to the PM system.  DO NOT or CHANGE the price.
Detail Level: Simple
Price (Do Not Change): 0.00
Christopher Patel